# Patient Record
Sex: FEMALE | Race: WHITE | NOT HISPANIC OR LATINO | Employment: UNEMPLOYED | URBAN - NONMETROPOLITAN AREA
[De-identification: names, ages, dates, MRNs, and addresses within clinical notes are randomized per-mention and may not be internally consistent; named-entity substitution may affect disease eponyms.]

---
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
## 2024-06-11 ENCOUNTER — TELEPHONE (OUTPATIENT)
Dept: OBSTETRICS AND GYNECOLOGY | Age: 19
End: 2024-06-11

## 2024-06-11 NOTE — TELEPHONE ENCOUNTER
Caller: Daya Her    Relationship to patient: Self    Best call back number: [phone]    Chief complaint: NEEDING TO SCHED BIRTH CONTROL CONSULT    Type of visit: NEW GYN - FORMER PATIENT OF DR GARCIA.     Requested date: ASAP     Additional notes:HUB UNABLE TO WARM TRANSFER

## 2024-07-01 ENCOUNTER — OFFICE VISIT (OUTPATIENT)
Dept: PRIMARY CARE CLINIC | Age: 19
End: 2024-07-01
Payer: MEDICAID

## 2024-07-01 VITALS
HEIGHT: 64 IN | OXYGEN SATURATION: 97 % | TEMPERATURE: 97.7 F | DIASTOLIC BLOOD PRESSURE: 84 MMHG | SYSTOLIC BLOOD PRESSURE: 128 MMHG | BODY MASS INDEX: 25.52 KG/M2 | HEART RATE: 95 BPM | WEIGHT: 149.5 LBS | RESPIRATION RATE: 20 BRPM

## 2024-07-01 DIAGNOSIS — Z13.1 SCREENING FOR DIABETES MELLITUS (DM): ICD-10-CM

## 2024-07-01 DIAGNOSIS — F41.9 ANXIETY: ICD-10-CM

## 2024-07-01 DIAGNOSIS — M25.50 POLYARTHRALGIA: Primary | ICD-10-CM

## 2024-07-01 DIAGNOSIS — Z13.29 SCREENING FOR THYROID DISORDER: ICD-10-CM

## 2024-07-01 LAB
ALBUMIN SERPL-MCNC: 4.8 G/DL (ref 3.5–5.2)
ALP SERPL-CCNC: 64 U/L (ref 35–104)
ALT SERPL-CCNC: 8 U/L (ref 5–33)
ANION GAP SERPL CALCULATED.3IONS-SCNC: 12 MMOL/L (ref 7–19)
AST SERPL-CCNC: 15 U/L (ref 5–32)
BASOPHILS # BLD: 0 K/UL (ref 0–0.2)
BASOPHILS NFR BLD: 0.3 % (ref 0–1)
BILIRUB SERPL-MCNC: 1.2 MG/DL (ref 0.2–1.2)
BUN SERPL-MCNC: 12 MG/DL (ref 6–20)
CALCIUM SERPL-MCNC: 9.9 MG/DL (ref 8.6–10)
CHLORIDE SERPL-SCNC: 103 MMOL/L (ref 98–111)
CO2 SERPL-SCNC: 26 MMOL/L (ref 22–29)
CREAT SERPL-MCNC: 0.7 MG/DL (ref 0.5–0.9)
CRP SERPL HS-MCNC: <0.3 MG/DL (ref 0–0.5)
EOSINOPHIL # BLD: 0.1 K/UL (ref 0–0.6)
EOSINOPHIL NFR BLD: 0.9 % (ref 0–5)
ERYTHROCYTE [DISTWIDTH] IN BLOOD BY AUTOMATED COUNT: 11.4 % (ref 11.5–14.5)
ERYTHROCYTE [SEDIMENTATION RATE] IN BLOOD BY WESTERGREN METHOD: 5 MM/HR (ref 0–20)
GLUCOSE SERPL-MCNC: 85 MG/DL (ref 74–109)
HBA1C MFR BLD: 4.9 % (ref 4–6)
HCT VFR BLD AUTO: 39.9 % (ref 37–47)
HGB BLD-MCNC: 13.1 G/DL (ref 12–16)
IMM GRANULOCYTES # BLD: 0 K/UL
LYMPHOCYTES # BLD: 2.8 K/UL (ref 1.1–4.5)
LYMPHOCYTES NFR BLD: 31.4 % (ref 20–40)
MCH RBC QN AUTO: 32 PG (ref 27–31)
MCHC RBC AUTO-ENTMCNC: 32.8 G/DL (ref 33–37)
MCV RBC AUTO: 97.3 FL (ref 81–99)
MONOCYTES # BLD: 0.5 K/UL (ref 0–0.9)
MONOCYTES NFR BLD: 5.1 % (ref 0–10)
NEUTROPHILS # BLD: 5.5 K/UL (ref 1.5–7.5)
NEUTS SEG NFR BLD: 62.2 % (ref 50–65)
PLATELET # BLD AUTO: 353 K/UL (ref 130–400)
PMV BLD AUTO: 10.3 FL (ref 9.4–12.3)
POTASSIUM SERPL-SCNC: 3.8 MMOL/L (ref 3.5–5)
PROT SERPL-MCNC: 7.5 G/DL (ref 6.6–8.7)
RBC # BLD AUTO: 4.1 M/UL (ref 4.2–5.4)
SODIUM SERPL-SCNC: 141 MMOL/L (ref 136–145)
T4 FREE SERPL-MCNC: 1.17 NG/DL (ref 0.93–1.7)
TSH SERPL DL<=0.005 MIU/L-ACNC: 2.23 UIU/ML (ref 0.27–4.2)
WBC # BLD AUTO: 8.8 K/UL (ref 4.8–10.8)

## 2024-07-01 PROCEDURE — 99205 OFFICE O/P NEW HI 60 MIN: CPT | Performed by: NURSE PRACTITIONER

## 2024-07-01 RX ORDER — HYDROXYZINE HYDROCHLORIDE 10 MG/1
10 TABLET, FILM COATED ORAL 3 TIMES DAILY PRN
Qty: 90 TABLET | Refills: 0 | Status: SHIPPED | OUTPATIENT
Start: 2024-07-01 | End: 2024-07-31

## 2024-07-01 RX ORDER — L. ACIDOPHILUS/BIFID. ANIMALIS 2B CELL
CAPSULE ORAL
COMMUNITY

## 2024-07-01 SDOH — ECONOMIC STABILITY: FOOD INSECURITY: WITHIN THE PAST 12 MONTHS, THE FOOD YOU BOUGHT JUST DIDN'T LAST AND YOU DIDN'T HAVE MONEY TO GET MORE.: PATIENT DECLINED

## 2024-07-01 SDOH — HEALTH STABILITY: PHYSICAL HEALTH: ON AVERAGE, HOW MANY MINUTES DO YOU ENGAGE IN EXERCISE AT THIS LEVEL?: 20 MIN

## 2024-07-01 SDOH — ECONOMIC STABILITY: INCOME INSECURITY
HOW HARD IS IT FOR YOU TO PAY FOR THE VERY BASICS LIKE FOOD, HOUSING, MEDICAL CARE, AND HEATING?: PATIENT UNABLE TO ANSWER

## 2024-07-01 SDOH — ECONOMIC STABILITY: FOOD INSECURITY: WITHIN THE PAST 12 MONTHS, YOU WORRIED THAT YOUR FOOD WOULD RUN OUT BEFORE YOU GOT MONEY TO BUY MORE.: PATIENT DECLINED

## 2024-07-01 SDOH — HEALTH STABILITY: PHYSICAL HEALTH: ON AVERAGE, HOW MANY DAYS PER WEEK DO YOU ENGAGE IN MODERATE TO STRENUOUS EXERCISE (LIKE A BRISK WALK)?: 5 DAYS

## 2024-07-01 SDOH — ECONOMIC STABILITY: HOUSING INSECURITY
IN THE LAST 12 MONTHS, WAS THERE A TIME WHEN YOU DID NOT HAVE A STEADY PLACE TO SLEEP OR SLEPT IN A SHELTER (INCLUDING NOW)?: PATIENT DECLINED

## 2024-07-01 ASSESSMENT — ENCOUNTER SYMPTOMS
NAUSEA: 0
DIARRHEA: 0
VOMITING: 0
EYES NEGATIVE: 1
ALLERGIC/IMMUNOLOGIC NEGATIVE: 1
SHORTNESS OF BREATH: 0
RESPIRATORY NEGATIVE: 1
CONSTIPATION: 0
ABDOMINAL DISTENTION: 0
WHEEZING: 0
COUGH: 0

## 2024-07-01 ASSESSMENT — PATIENT HEALTH QUESTIONNAIRE - PHQ9
1. LITTLE INTEREST OR PLEASURE IN DOING THINGS: NOT AT ALL
SUM OF ALL RESPONSES TO PHQ QUESTIONS 1-9: 0
2. FEELING DOWN, DEPRESSED OR HOPELESS: NOT AT ALL
SUM OF ALL RESPONSES TO PHQ9 QUESTIONS 1 & 2: 0
SUM OF ALL RESPONSES TO PHQ QUESTIONS 1-9: 0

## 2024-07-01 NOTE — PROGRESS NOTES
[facility]  [id number] [place] [address]  Dept: [phone]  Dept Fax: [phone]  Loc: [phone]    Diane Casey is a 18 y.o. female who presents today for her medical conditions/complaints as noted below.  Diane Casey is c/o of New Patient (Pt is here to establish care. Pt has a spot on her ear that has started swelling and about a month ago it leaked some kind of fluid. ) and Generalized Body Aches (PT c/o body and joint pain that she states she has dealt with this her entire life. )        HPI:     HPI this 18-year-old female presents today to establish care.  She states that she does have some concerned over body aches developed around covid.  She states that it is mainly her joints and just hurting all over.  She states that if she does much of anything with her upper arms and it will be her shoulders and that her knees are very susceptible to cold temperatures.  She states that they started trying to calculate this back and do not do report that it seems to have started around the time that she had COVID.  She states that she did not have have a very severe case of COVID.  She does report that she basically had a migraine for several days and some mild cough.  She does report that this has been reviewed in the past and there was some lab work done but they did not really ever tell her anything and just sort of shook it off so she was trying to have further evaluation.  She also reports that she does have an issue with becoming overwhelmed and overstimulated very easily and that when mom is here with her today and reports that when this happens it is a meltdown.  Mom does report that at one point she was diagnosed with bipolar but as it turns out they feel like that was a misdiagnosis and it was more appropriately PTSD.  Mom does report that her and both the girls have had serious issues with traumatic events through over the years and they have

## 2024-07-01 NOTE — PATIENT INSTRUCTIONS
Magnesium glycinate nightly                MyFitnessPal     Sleep Hygiene    Avoid screen time for one to two hours before sleep.  Avoid caffeine four hours before bedtime.  Develop bedtime routine and be consistent with it.  Such as warm shower/bath.   Ice pack to forehead may help calm.  Calming habit such as Reading/meditation/music therapy.  May take Melatonin one hour before bedtime.         Please advise my chart message, thank you

## 2024-07-02 ASSESSMENT — ENCOUNTER SYMPTOMS
ABDOMINAL PAIN: 1
BACK PAIN: 1

## 2024-07-05 LAB — B BURGDOR.VLSE1+PEPC10 AB SER IA-ACNC: 0.21 IV

## 2024-07-16 ENCOUNTER — OFFICE VISIT (OUTPATIENT)
Dept: OBSTETRICS AND GYNECOLOGY | Age: 19
End: 2024-07-16
Payer: MEDICAID

## 2024-07-16 VITALS
WEIGHT: 152 LBS | HEIGHT: 63 IN | DIASTOLIC BLOOD PRESSURE: 72 MMHG | SYSTOLIC BLOOD PRESSURE: 128 MMHG | BODY MASS INDEX: 26.93 KG/M2

## 2024-07-16 DIAGNOSIS — Z76.89 ENCOUNTER TO ESTABLISH CARE WITH NEW DOCTOR: Primary | ICD-10-CM

## 2024-07-16 DIAGNOSIS — N92.0 MENORRHAGIA WITH REGULAR CYCLE: ICD-10-CM

## 2024-07-16 RX ORDER — NORETHINDRONE ACETATE AND ETHINYL ESTRADIOL AND FERROUS FUMARATE 1MG-20(24)
1 KIT ORAL DAILY
Qty: 28 TABLET | Refills: 12 | Status: SHIPPED | OUTPATIENT
Start: 2024-07-16 | End: 2025-07-16

## 2024-07-16 RX ORDER — HYDROXYZINE HYDROCHLORIDE 10 MG/1
10 TABLET, FILM COATED ORAL
COMMUNITY
Start: 2024-07-01 | End: 2024-08-01

## 2024-07-16 RX ORDER — L. ACIDOPHILUS/BIFID. ANIMALIS 2B CELL
CAPSULE ORAL
COMMUNITY

## 2024-07-16 NOTE — PROGRESS NOTES
"Chief Complaint   Patient presents with    Contraception     Patient is new to our office and here to establish care. Transfer from Dr. Jack. Patient is due for annual well GYN Exam, 7/11/23. Last OV 10/17/23, stopped Apri OCP to have hormone break per pt request. Periods are painful, but regular in timing. Patient denies current pelvic pain, abnormal vaginal bleeding or discharge, dyspareunia, and voices no other complaints.       History:  Daya Her is a 18 y.o. female who presents today for follow-up for evaluation of the above:    HPI    Establishing care today and she would like to restart OCP.   She reports issues with menstrual cramps and return of acne.   Stopped OCP in the past due to mood changes which did improve after stopping her OCP. No SI    Not sexually active at this time.  Periods are regular.           ROS:  Review of Systems   Constitutional: Negative.    HENT: Negative.          Acne   Eyes: Negative.    Respiratory: Negative.     Cardiovascular: Negative.    Gastrointestinal: Negative.    Endocrine: Negative.    Genitourinary:  Positive for menstrual problem.   Musculoskeletal: Negative.    Skin: Negative.    Neurological: Negative.    Psychiatric/Behavioral: Negative.         Ms. Her  reports that she has never smoked. She has never used smokeless tobacco. She reports that she does not drink alcohol and does not use drugs.      Current Outpatient Medications:     hydrOXYzine (ATARAX) 10 MG tablet, Take 1 tablet by mouth., Disp: , Rfl:     Multiple Vitamins-Minerals (One-A-Day Womens VitaCraves) chewable tablet, Chew., Disp: , Rfl:     norethindrone-ethinyl estradiol-ferrous fumarate (LOESTIN 24 FE) 1-20 MG-MCG(24) per tablet, Take 1 tablet by mouth Daily., Disp: 28 tablet, Rfl: 12      OBJECTIVE:  /72   Ht 160 cm (63\")   Wt 68.9 kg (152 lb)   LMP 06/16/2024 (Exact Date)   BMI 26.93 kg/m²    Physical Exam  Constitutional:       Appearance: She is not ill-appearing. " "  Pulmonary:      Effort: No respiratory distress.   Neurological:      Mental Status: She is oriented to person, place, and time.   Psychiatric:         Behavior: Behavior normal.         Assessment/Plan    Diagnoses and all orders for this visit:    1. Encounter to establish care with new doctor (Primary)    2. Menorrhagia with regular cycle  -     norethindrone-ethinyl estradiol-ferrous fumarate (LOESTIN 24 FE) 1-20 MG-MCG(24) per tablet; Take 1 tablet by mouth Daily.  Dispense: 28 tablet; Refill: 12    No birth control works 100 percent perfectly all the time.  Hormonal birth control is a safe and reliable way to prevent pregnancy for most people. But it does not protect you from infections that spread through sex. Condoms are the only form of birth control that can also protect against infections that you can get through sex. (These are called sexually transmitted infections, or \"STIs.\") They are a good choice if you don't know your partner's sexual history or if you or a partner already has an STI. Some people use condoms in addition to another type of birth control, such as the pill.       An After Visit Summary was printed and given to the patient at discharge.  Return in about 3 months (around 10/16/2024) for 3-4 months for medication check. Sooner if problems arise.          Kimberly ALEXANDER. 7/16/2024   Electronically Signed  "

## 2024-07-23 ENCOUNTER — OFFICE VISIT (OUTPATIENT)
Dept: PRIMARY CARE CLINIC | Age: 19
End: 2024-07-23
Payer: MEDICAID

## 2024-07-23 VITALS
OXYGEN SATURATION: 97 % | BODY MASS INDEX: 26.26 KG/M2 | HEART RATE: 81 BPM | HEIGHT: 64 IN | WEIGHT: 153.8 LBS | TEMPERATURE: 98 F | RESPIRATION RATE: 18 BRPM | SYSTOLIC BLOOD PRESSURE: 122 MMHG | DIASTOLIC BLOOD PRESSURE: 84 MMHG

## 2024-07-23 DIAGNOSIS — F41.9 ANXIETY: Primary | ICD-10-CM

## 2024-07-23 DIAGNOSIS — M25.50 POLYARTHRALGIA: ICD-10-CM

## 2024-07-23 PROCEDURE — 99214 OFFICE O/P EST MOD 30 MIN: CPT | Performed by: NURSE PRACTITIONER

## 2024-07-23 NOTE — PROGRESS NOTES
printed texts.  The electronic translation of spoken language may be erroneous, or at times, nonsensical words or phrases may be inadvertentlytranscribed.  Although I have reviewed the note for such errors, some may still exist.

## 2024-07-23 NOTE — PATIENT INSTRUCTIONS
Increase water 64 oz a day  Increase protein to 100 gm a day  Start 30 minutes of healthy movement or exercise 3 times a week     Limit   Added sugars   Sweets  Breads   Fried and fatty foods  Starchy veggies       Patient is encouraged to consider a local training or weight loss program such as:  [employer] on [employer] or [employer]  Dr. Ló[facility] program  Or other weight loss programs through a local gym.     Accountability and consistency are two keys to success to long term weight loss.     BMR  69.8   162.6   95  10 x (weight in kilogram) + 6.25 (height in centimeters) - 5 (age) - 161 = BMR  BMR x 1.2 ( for those who are not overly active) = caloric deficit   698  + 1016- 95- 161 = 1458 x 1.2 =1749    At least 100 gm a day       The fundamentals of weight loss come down to the fact that calories in have to be less than calories out which results in a calorie deficit.  However healthy weight loss will also require you to meet your basal metabolic rate.  While some of us eat way too many calories, others eat way too few causing the body to store most intake due to metabolic adaptation.     Increase water intake with a goal of 1 gallon per day.    Download a calorie counting connor such as my fitness pal or other program.    Keep a journal of everything that you eat or drink for 2 weeks.    At the end of each day calculate your caloric intake.  This gives you knowledge of what you are actually getting in each day.  By calculating your BMR you will know the amount of calories that your body needs every day to function appropriately.  Focus on making better nutritional choices, eliminate simple sugars or sugary drinks such as soda or tea.  Limit starchy vegetables such as potatoes or corn.  Use healthier choices of breads and pastas such as Banza pasta, Demetrio bread, low-carb wraps or keto bread.     Eating plans such as the Mediterranean diet, [place] diet or Trim Healthy

## 2024-07-25 ASSESSMENT — ENCOUNTER SYMPTOMS
RESPIRATORY NEGATIVE: 1
NAUSEA: 0
CONSTIPATION: 0
WHEEZING: 0
EYES NEGATIVE: 1
COUGH: 0
ABDOMINAL DISTENTION: 0
SHORTNESS OF BREATH: 0
ABDOMINAL PAIN: 0
VOMITING: 0
DIARRHEA: 0
GASTROINTESTINAL NEGATIVE: 1
ALLERGIC/IMMUNOLOGIC NEGATIVE: 1

## 2024-07-30 DIAGNOSIS — F41.9 ANXIETY: ICD-10-CM

## 2024-07-31 RX ORDER — HYDROXYZINE HYDROCHLORIDE 10 MG/1
10 TABLET, FILM COATED ORAL 3 TIMES DAILY PRN
Qty: 90 TABLET | Refills: 0 | Status: SHIPPED | OUTPATIENT
Start: 2024-07-31 | End: 2024-08-30

## 2024-08-06 ENCOUNTER — OFFICE VISIT (OUTPATIENT)
Dept: PRIMARY CARE CLINIC | Age: 19
End: 2024-08-06
Payer: MEDICAID

## 2024-08-06 VITALS
OXYGEN SATURATION: 98 % | HEART RATE: 87 BPM | SYSTOLIC BLOOD PRESSURE: 124 MMHG | BODY MASS INDEX: 27.14 KG/M2 | TEMPERATURE: 97.8 F | RESPIRATION RATE: 18 BRPM | HEIGHT: 63 IN | DIASTOLIC BLOOD PRESSURE: 80 MMHG | WEIGHT: 153.2 LBS

## 2024-08-06 DIAGNOSIS — L98.8 CUTANEOUS DIMPLE: Primary | ICD-10-CM

## 2024-08-06 PROCEDURE — 99213 OFFICE O/P EST LOW 20 MIN: CPT | Performed by: NURSE PRACTITIONER

## 2024-08-06 ASSESSMENT — ENCOUNTER SYMPTOMS
COLOR CHANGE: 0
EYES NEGATIVE: 1
GASTROINTESTINAL NEGATIVE: 1
RESPIRATORY NEGATIVE: 1
COUGH: 0
SHORTNESS OF BREATH: 0
WHEEZING: 0
ALLERGIC/IMMUNOLOGIC NEGATIVE: 1

## 2024-08-06 NOTE — PATIENT INSTRUCTIONS
Wash face with dial soap   May apply triple antibiotic ointment  as needed if red , swollen or draining  Hydrogen peroxide to site daily

## 2024-08-06 NOTE — PROGRESS NOTES
[facility]  [id number] [facility]  [place] [address]  Dept: [phone]  Dept Fax: [phone]  Loc: [phone]    Diane Casey is a 18 y.o. female who presents today for her medical conditions/complaints as noted below.  Diane Casey is c/o of Ear Drainage (Pt states that she is having some drainage of left ear. Pt states that it sometimes gets painful and is disrupting her sleep because she is a side sleeper. Pt states that it does itch at times and sometimes has a needle point feeling. Mom states she believes this is because patient was a week over due and this is from how she was laying in utero. )        HPI:     HPI this 18-year-old female is brought in today by her mom for a area of concern in front of the upper part of her earlobe.  Mom reports this has been there since she was born.  States that they thought it was because when she was in utero her ear was folded over.  States that recently she has had some what she thought was drainage coming from it and states that it did cause some discomfort when she was sleeping 1 day.  Chief Complaint   Patient presents with    Ear Drainage     Pt states that she is having some drainage of left ear. Pt states that it sometimes gets painful and is disrupting her sleep because she is a side sleeper. Pt states that it does itch at times and sometimes has a needle point feeling. Mom states she believes this is because patient was a week over due and this is from how she was laying in utero.      Past Medical History:   Diagnosis Date    Parvovirus IgM present in blood       No past surgical history on file.        8/6/2024     3:09 PM 7/23/2024     3:45 PM 7/1/2024     2:38 PM 8/3/2023     1:48 PM 6/2/2023    12:43 AM 6/2/2023    12:39 AM   Vitals   SYSTOLIC 124 122 128 112 135    DIASTOLIC 80 84 84 70 85    Site  Left Upper

## 2024-08-26 DIAGNOSIS — F41.9 ANXIETY: ICD-10-CM

## 2024-08-26 RX ORDER — HYDROXYZINE HYDROCHLORIDE 10 MG/1
TABLET, FILM COATED ORAL
Qty: 90 TABLET | Refills: 0 | Status: SHIPPED | OUTPATIENT
Start: 2024-08-26

## 2024-09-21 DIAGNOSIS — F41.9 ANXIETY: ICD-10-CM

## 2024-09-23 RX ORDER — HYDROXYZINE HYDROCHLORIDE 10 MG/1
TABLET, FILM COATED ORAL
Qty: 90 TABLET | Refills: 0 | Status: SHIPPED | OUTPATIENT
Start: 2024-09-23

## 2024-10-22 ENCOUNTER — OFFICE VISIT (OUTPATIENT)
Dept: OBSTETRICS AND GYNECOLOGY | Age: 19
End: 2024-10-22
Payer: MEDICAID

## 2024-10-22 VITALS
BODY MASS INDEX: 27.11 KG/M2 | DIASTOLIC BLOOD PRESSURE: 70 MMHG | SYSTOLIC BLOOD PRESSURE: 126 MMHG | WEIGHT: 153 LBS | HEIGHT: 63 IN

## 2024-10-22 DIAGNOSIS — N92.0 MENORRHAGIA WITH REGULAR CYCLE: Primary | ICD-10-CM

## 2024-10-22 RX ORDER — HYDROXYZINE HYDROCHLORIDE 10 MG/1
10 TABLET, FILM COATED ORAL 3 TIMES DAILY PRN
COMMUNITY

## 2024-10-22 NOTE — PROGRESS NOTES
"Chief Complaint   Patient presents with    Contraception     Patient is here to follow up after starting Loestin OCP 7/16/24 for menorrhagia with regular cycle. Pt reports improvement in cramps and mood. Pt would like to continue taking.         History:  Daya Her is a 19 y.o. female who presents today for follow-up for evaluation of the above:    HPI    Patient presents today for f/u on OCP. She started this on 07/16/2024 for menorrhagia. She reports improvement of symptoms. No negative side effects at this time.        ROS:  Review of Systems   Constitutional: Negative.    HENT: Negative.     Eyes: Negative.    Respiratory: Negative.     Cardiovascular: Negative.    Gastrointestinal: Negative.    Endocrine: Negative.    Genitourinary: Negative.    Musculoskeletal: Negative.    Skin: Negative.    Neurological: Negative.    Psychiatric/Behavioral: Negative.         Ms. Her  reports that she has never smoked. She has never used smokeless tobacco. She reports that she does not drink alcohol and does not use drugs.      Current Outpatient Medications:     hydrOXYzine (ATARAX) 10 MG tablet, Take 1 tablet by mouth 3 (Three) Times a Day As Needed for Itching., Disp: , Rfl:     Multiple Vitamins-Minerals (One-A-Day Womens VitaCraves) chewable tablet, Chew., Disp: , Rfl:     norethindrone-ethinyl estradiol-ferrous fumarate (LOESTIN 24 FE) 1-20 MG-MCG(24) per tablet, Take 1 tablet by mouth Daily., Disp: 28 tablet, Rfl: 12      OBJECTIVE:  /70   Ht 160 cm (63\")   Wt 69.4 kg (153 lb)   LMP 10/11/2024 (Exact Date)   BMI 27.10 kg/m²    Physical Exam  Constitutional:       Appearance: She is not ill-appearing.   Pulmonary:      Effort: No respiratory distress.   Neurological:      Mental Status: She is oriented to person, place, and time.   Psychiatric:         Behavior: Behavior normal.         Assessment/Plan    Diagnoses and all orders for this visit:    1. Menorrhagia with regular cycle " (Primary)  Comments:  improved on OCP at this time.         An After Visit Summary was printed and given to the patient at discharge.  Return in about 10 months (around 8/22/2025) for Annual physical. Sooner if problems arise.          Kimberly ALEXANDER. 10/23/2024   Electronically Signed

## 2024-10-24 ENCOUNTER — OFFICE VISIT (OUTPATIENT)
Dept: PRIMARY CARE CLINIC | Age: 19
End: 2024-10-24
Payer: MEDICAID

## 2024-10-24 VITALS
BODY MASS INDEX: 27.89 KG/M2 | HEART RATE: 65 BPM | TEMPERATURE: 98.7 F | RESPIRATION RATE: 16 BRPM | DIASTOLIC BLOOD PRESSURE: 62 MMHG | WEIGHT: 157.4 LBS | SYSTOLIC BLOOD PRESSURE: 110 MMHG | OXYGEN SATURATION: 99 % | HEIGHT: 63 IN

## 2024-10-24 DIAGNOSIS — M25.50 POLYARTHRALGIA: Primary | ICD-10-CM

## 2024-10-24 DIAGNOSIS — F41.9 ANXIETY AND DEPRESSION: ICD-10-CM

## 2024-10-24 DIAGNOSIS — F32.A ANXIETY AND DEPRESSION: ICD-10-CM

## 2024-10-24 PROCEDURE — 99214 OFFICE O/P EST MOD 30 MIN: CPT | Performed by: NURSE PRACTITIONER

## 2024-10-24 RX ORDER — DULOXETIN HYDROCHLORIDE 20 MG/1
20 CAPSULE, DELAYED RELEASE ORAL DAILY
Qty: 30 CAPSULE | Refills: 3 | Status: SHIPPED | OUTPATIENT
Start: 2024-10-24

## 2024-10-24 NOTE — PATIENT INSTRUCTIONS
Sprain/strain   First 24 hours, use ice to injury site for 15 minutes at a time.  After 48 hours, may alternate ice/heat 15 minutes each.  R.I. C.E. therapy = rest, ice, compression and elevation.  May use ace wrap to injured area for compression.  Use Ibuprofen or other antiinflammatory for pain and inflammation.   If no open skin areas, OTC topical lidocaine such as Icy Hot or capsaicin creams may be applied for pain relief.  Slow stretches of area may help reduce spasms and improve range of motion.   Alternate Voltaren cream with Biofreeze gel to site 2 to 3 times a day .

## 2024-10-24 NOTE — PROGRESS NOTES
HENT: Negative.     Eyes: Negative.    Respiratory: Negative.  Negative for cough, shortness of breath and wheezing.    Cardiovascular: Negative.  Negative for chest pain and palpitations.   Gastrointestinal: Negative.  Negative for abdominal distention, abdominal pain, constipation, diarrhea, nausea and vomiting.   Endocrine: Negative.    Genitourinary: Negative.  Negative for difficulty urinating and dysuria.   Musculoskeletal:  Positive for arthralgias, gait problem, myalgias and neck pain.   Skin: Negative.  Negative for color change and rash.   Allergic/Immunologic: Negative.    Neurological:  Negative for dizziness and headaches.   Hematological: Negative.    Psychiatric/Behavioral:  Positive for dysphoric mood. Negative for self-injury and suicidal ideas. The patient is nervous/anxious.        Objective:     Physical Exam  Vitals and nursing note reviewed.   Constitutional:       General: She is not in acute distress.     Appearance: Normal appearance. She is not ill-appearing or toxic-appearing.   HENT:      Head: Normocephalic and atraumatic.      Nose: Nose normal.      Mouth/Throat:      Mouth: Mucous membranes are moist.   Eyes:      Pupils: Pupils are equal, round, and reactive to light.   Cardiovascular:      Rate and Rhythm: Normal rate and regular rhythm.      Pulses: Normal pulses.      Heart sounds: Normal heart sounds. No murmur heard.  Pulmonary:      Effort: Pulmonary effort is normal. No respiratory distress.      Breath sounds: Normal breath sounds. No wheezing, rhonchi or rales.   Abdominal:      General: Bowel sounds are normal.      Palpations: Abdomen is soft.   Musculoskeletal:         General: Normal range of motion.      Right shoulder: Normal.      Left shoulder: Normal.      Cervical back: Normal range of motion.      Right lower leg: No edema.      Left lower leg: No edema.      Comments: Physical assessment of musculoskeletal system has no apparent abnormalities, reduction in

## 2024-10-28 PROBLEM — F41.9 ANXIETY AND DEPRESSION: Status: ACTIVE | Noted: 2024-10-28

## 2024-10-28 PROBLEM — F32.A ANXIETY AND DEPRESSION: Status: ACTIVE | Noted: 2024-10-28

## 2024-10-28 ASSESSMENT — ENCOUNTER SYMPTOMS
SHORTNESS OF BREATH: 0
NAUSEA: 0
ABDOMINAL PAIN: 0
VOMITING: 0
ALLERGIC/IMMUNOLOGIC NEGATIVE: 1
COUGH: 0
ABDOMINAL DISTENTION: 0
GASTROINTESTINAL NEGATIVE: 1
DIARRHEA: 0
RESPIRATORY NEGATIVE: 1
COLOR CHANGE: 0
CONSTIPATION: 0
WHEEZING: 0
EYES NEGATIVE: 1

## 2024-12-03 ENCOUNTER — OFFICE VISIT (OUTPATIENT)
Dept: PRIMARY CARE CLINIC | Age: 19
End: 2024-12-03

## 2024-12-03 VITALS
RESPIRATION RATE: 18 BRPM | HEIGHT: 63 IN | BODY MASS INDEX: 28 KG/M2 | OXYGEN SATURATION: 99 % | HEART RATE: 100 BPM | TEMPERATURE: 98 F | DIASTOLIC BLOOD PRESSURE: 84 MMHG | SYSTOLIC BLOOD PRESSURE: 112 MMHG | WEIGHT: 158 LBS

## 2024-12-03 DIAGNOSIS — M25.50 POLYARTHRALGIA: ICD-10-CM

## 2024-12-03 DIAGNOSIS — F41.9 ANXIETY AND DEPRESSION: Primary | ICD-10-CM

## 2024-12-03 DIAGNOSIS — F32.A ANXIETY AND DEPRESSION: Primary | ICD-10-CM

## 2024-12-03 DIAGNOSIS — Z23 NEED FOR INFLUENZA VACCINATION: ICD-10-CM

## 2024-12-03 DIAGNOSIS — F41.9 ANXIETY: ICD-10-CM

## 2024-12-03 ASSESSMENT — ENCOUNTER SYMPTOMS
ABDOMINAL PAIN: 0
WHEEZING: 0
VOMITING: 0
RESPIRATORY NEGATIVE: 1
CONSTIPATION: 0
EYES NEGATIVE: 1
DIARRHEA: 0
SHORTNESS OF BREATH: 0
GASTROINTESTINAL NEGATIVE: 1
ABDOMINAL DISTENTION: 0
NAUSEA: 0
ALLERGIC/IMMUNOLOGIC NEGATIVE: 1
COUGH: 0

## 2024-12-03 NOTE — PROGRESS NOTES
As per orders of BETH Peterson, injection of Flucelvax was given in Left Deltoid by Janki Dover CMA. Patient tolerated well. Advised to take tylenol/ibuprofen for soreness and fever. Understanding was voiced.

## 2024-12-03 NOTE — PROGRESS NOTES
[facility]  [id number] [facility]  [place] [address]  Dept: [phone]  Dept Fax: [phone]  Loc: [phone]    Diane Casey is a 19 y.o. female who presents today for her medical conditions/complaints as noted below.  Diane Casey is c/o of Follow-up (Pt is here for 1 month follow up on pain and anxiety/depression. Pt states she is feeling amazing. PT states the pain is still present, but it is not on the forefront of her mind. Pt states she feels like there depression and anxiety are well managed. No new concerns. )        HPI:     HPI this 19-year-old female presents today for 1 month follow-up on her anxiety and depression.  She states that she feels amazing.  She states that she still has to acknowledge that there is pain but it is not the forefront of her mind.  She states she feels like all further symptoms are well-managed.  She denies any other concerns.  She states that she is doing so much better that she is brushing her teeth twice a day and she is exercising.  States that when they went out to eat the other day in a restaurant that she actually was the one talking to the waiters which is very unusual for her.  She states that it was natural and very easy.  She is just very excited this is the best that she is felt.  She denies manic symptoms.  She states that she feels like she is being very particular about the presence that she buys for Ginny she does not samia she is overspending.  She states she has not had the urge to call or message any of her friends that she does not need to.  Does not feel she is having any poor decisions.  She just feels good  Chief Complaint   Patient presents with    Follow-up     Pt is here for 1 month follow up on pain and anxiety/depression. Pt states she is feeling amazing. PT states the pain is still present, but it is not on the forefront of her mind. Pt states she feels like there depression and anxiety are well

## 2024-12-30 RX ORDER — DULOXETIN HYDROCHLORIDE 30 MG/1
30 CAPSULE, DELAYED RELEASE ORAL DAILY
Qty: 30 CAPSULE | Refills: 3 | Status: SHIPPED | OUTPATIENT
Start: 2024-12-30

## 2025-01-11 SDOH — ECONOMIC STABILITY: FOOD INSECURITY: WITHIN THE PAST 12 MONTHS, THE FOOD YOU BOUGHT JUST DIDN'T LAST AND YOU DIDN'T HAVE MONEY TO GET MORE.: OFTEN TRUE

## 2025-01-11 SDOH — ECONOMIC STABILITY: INCOME INSECURITY: IN THE LAST 12 MONTHS, WAS THERE A TIME WHEN YOU WERE NOT ABLE TO PAY THE MORTGAGE OR RENT ON TIME?: NO

## 2025-01-11 SDOH — ECONOMIC STABILITY: TRANSPORTATION INSECURITY
IN THE PAST 12 MONTHS, HAS THE LACK OF TRANSPORTATION KEPT YOU FROM MEDICAL APPOINTMENTS OR FROM GETTING MEDICATIONS?: YES

## 2025-01-11 SDOH — ECONOMIC STABILITY: FOOD INSECURITY: WITHIN THE PAST 12 MONTHS, YOU WORRIED THAT YOUR FOOD WOULD RUN OUT BEFORE YOU GOT MONEY TO BUY MORE.: OFTEN TRUE

## 2025-01-11 SDOH — ECONOMIC STABILITY: TRANSPORTATION INSECURITY
IN THE PAST 12 MONTHS, HAS LACK OF TRANSPORTATION KEPT YOU FROM MEETINGS, WORK, OR FROM GETTING THINGS NEEDED FOR DAILY LIVING?: YES

## 2025-01-11 ASSESSMENT — PATIENT HEALTH QUESTIONNAIRE - PHQ9
10. IF YOU CHECKED OFF ANY PROBLEMS, HOW DIFFICULT HAVE THESE PROBLEMS MADE IT FOR YOU TO DO YOUR WORK, TAKE CARE OF THINGS AT HOME, OR GET ALONG WITH OTHER PEOPLE: SOMEWHAT DIFFICULT
SUM OF ALL RESPONSES TO PHQ QUESTIONS 1-9: 5
6. FEELING BAD ABOUT YOURSELF - OR THAT YOU ARE A FAILURE OR HAVE LET YOURSELF OR YOUR FAMILY DOWN: NOT AT ALL
2. FEELING DOWN, DEPRESSED OR HOPELESS: NOT AT ALL
4. FEELING TIRED OR HAVING LITTLE ENERGY: NOT AT ALL
1. LITTLE INTEREST OR PLEASURE IN DOING THINGS: SEVERAL DAYS
SUM OF ALL RESPONSES TO PHQ9 QUESTIONS 1 & 2: 1
4. FEELING TIRED OR HAVING LITTLE ENERGY: NOT AT ALL
7. TROUBLE CONCENTRATING ON THINGS, SUCH AS READING THE NEWSPAPER OR WATCHING TELEVISION: NOT AT ALL
10. IF YOU CHECKED OFF ANY PROBLEMS, HOW DIFFICULT HAVE THESE PROBLEMS MADE IT FOR YOU TO DO YOUR WORK, TAKE CARE OF THINGS AT HOME, OR GET ALONG WITH OTHER PEOPLE: SOMEWHAT DIFFICULT
SUM OF ALL RESPONSES TO PHQ QUESTIONS 1-9: 5
9. THOUGHTS THAT YOU WOULD BE BETTER OFF DEAD, OR OF HURTING YOURSELF: NOT AT ALL
8. MOVING OR SPEAKING SO SLOWLY THAT OTHER PEOPLE COULD HAVE NOTICED. OR THE OPPOSITE - BEING SO FIDGETY OR RESTLESS THAT YOU HAVE BEEN MOVING AROUND A LOT MORE THAN USUAL: SEVERAL DAYS
9. THOUGHTS THAT YOU WOULD BE BETTER OFF DEAD, OR OF HURTING YOURSELF: NOT AT ALL
7. TROUBLE CONCENTRATING ON THINGS, SUCH AS READING THE NEWSPAPER OR WATCHING TELEVISION: NOT AT ALL
SUM OF ALL RESPONSES TO PHQ QUESTIONS 1-9: 5
1. LITTLE INTEREST OR PLEASURE IN DOING THINGS: SEVERAL DAYS
5. POOR APPETITE OR OVEREATING: NOT AT ALL
8. MOVING OR SPEAKING SO SLOWLY THAT OTHER PEOPLE COULD HAVE NOTICED. OR THE OPPOSITE, BEING SO FIGETY OR RESTLESS THAT YOU HAVE BEEN MOVING AROUND A LOT MORE THAN USUAL: SEVERAL DAYS
2. FEELING DOWN, DEPRESSED OR HOPELESS: NOT AT ALL
6. FEELING BAD ABOUT YOURSELF - OR THAT YOU ARE A FAILURE OR HAVE LET YOURSELF OR YOUR FAMILY DOWN: NOT AT ALL
3. TROUBLE FALLING OR STAYING ASLEEP: NEARLY EVERY DAY
5. POOR APPETITE OR OVEREATING: NOT AT ALL
3. TROUBLE FALLING OR STAYING ASLEEP: NEARLY EVERY DAY

## 2025-01-14 ENCOUNTER — OFFICE VISIT (OUTPATIENT)
Dept: PRIMARY CARE CLINIC | Age: 20
End: 2025-01-14
Payer: MEDICAID

## 2025-01-14 VITALS
BODY MASS INDEX: 26.75 KG/M2 | OXYGEN SATURATION: 100 % | HEIGHT: 63 IN | TEMPERATURE: 97.9 F | RESPIRATION RATE: 16 BRPM | WEIGHT: 151 LBS | HEART RATE: 82 BPM | SYSTOLIC BLOOD PRESSURE: 120 MMHG | DIASTOLIC BLOOD PRESSURE: 70 MMHG

## 2025-01-14 DIAGNOSIS — F32.A ANXIETY AND DEPRESSION: Primary | ICD-10-CM

## 2025-01-14 DIAGNOSIS — F41.9 ANXIETY AND DEPRESSION: Primary | ICD-10-CM

## 2025-01-14 PROCEDURE — 99213 OFFICE O/P EST LOW 20 MIN: CPT | Performed by: NURSE PRACTITIONER

## 2025-01-14 RX ORDER — NORETHINDRONE ACETATE/ETHINYL ESTRADIOL AND FERROUS FUMARATE 1MG-20(24)
1 KIT ORAL DAILY
COMMUNITY
Start: 2025-01-02

## 2025-01-14 SDOH — ECONOMIC STABILITY: FOOD INSECURITY: WITHIN THE PAST 12 MONTHS, THE FOOD YOU BOUGHT JUST DIDN'T LAST AND YOU DIDN'T HAVE MONEY TO GET MORE.: NEVER TRUE

## 2025-01-14 SDOH — ECONOMIC STABILITY: FOOD INSECURITY: WITHIN THE PAST 12 MONTHS, YOU WORRIED THAT YOUR FOOD WOULD RUN OUT BEFORE YOU GOT MONEY TO BUY MORE.: NEVER TRUE

## 2025-01-14 NOTE — PROGRESS NOTES
[facility]  [address] [place] [address]  Dept: [phone]  Dept Fax: [phone]  Loc: [phone]    Diane Casey is a 19 y.o. female who presents today for her medical conditions/complaints as noted below.  Diane Casey is c/o of Follow-up (Anxiety and depression is still doing well with medication.  Depression screening performed online by patient. )        HPI:     HPI   Chief Complaint   Patient presents with    Follow-up     Anxiety and depression is still doing well with medication.  Depression screening performed online by patient.      Past Medical History:   Diagnosis Date    Anxiety     Depression     Parvovirus IgM present in blood       No past surgical history on file.        1/14/2025     1:42 PM 12/3/2024     3:27 PM 10/24/2024    11:42 AM 8/6/2024     3:09 PM 7/23/2024     3:45 PM 7/1/2024     2:38 PM   Vitals   SYSTOLIC 120 112 110 124 122 128   DIASTOLIC 70 84 62 80 84 84   Site Left Upper Arm Left Upper Arm   Left Upper Arm Left Upper Arm   Position Sitting Sitting   Sitting Sitting   Cuff Size  Medium Adult   Medium Adult Medium Adult   Pulse 82 100 65 87 81 95   Temp 97.9 °F (36.6 °C) 98 °F (36.7 °C) 98.7 °F (37.1 °C) 97.8 °F (36.6 °C) 98 °F (36.7 °C) 97.7 °F (36.5 °C)   Respirations 16 18 16 18 18 20   SpO2 100 % 99 % 99 % 98 % 97 % 97 %   Weight - Scale 151 lb 158 lb 157 lb 6.4 oz 153 lb 3.2 oz 153 lb 12.8 oz 149 lb 8 oz   Height 5' 3\" 5' 3\" 5' 3\" 5' 3\" 5' 4\" 5' 4\"   Body Mass Index 26.75 kg/m2 27.99 kg/m2 27.88 kg/m2 27.14 kg/m2 26.4 kg/m2 25.66 kg/m2       Family History   Problem Relation Age of Onset    Breast Cancer Mother     Cancer Maternal Grandfather     Stroke Maternal Grandmother     Arthritis Maternal Aunt     Miscarriages / Stillbirths Maternal Aunt     Rheum Arthritis Maternal Aunt        Social History     Tobacco Use    Smoking status: Never     Passive exposure: Never    Smokeless tobacco: Never   Substance Use 
  Gastrointestinal: Negative.  Negative for abdominal pain, constipation, diarrhea, nausea and vomiting.   Endocrine: Negative.    Genitourinary: Negative.  Negative for difficulty urinating and dysuria.   Musculoskeletal: Negative.    Skin: Negative.    Allergic/Immunologic: Negative.    Neurological: Negative.  Negative for headaches.   Hematological: Negative.    Psychiatric/Behavioral: Negative.  Negative for self-injury and sleep disturbance. The patient is not nervous/anxious.    All other systems reviewed and are negative.      Objective:     Physical Exam  Vitals and nursing note reviewed.   Constitutional:       General: She is not in acute distress.     Appearance: Normal appearance. She is not ill-appearing, toxic-appearing or diaphoretic.   HENT:      Head: Normocephalic and atraumatic.      Nose: Nose normal.      Mouth/Throat:      Mouth: Mucous membranes are moist.      Pharynx: Oropharynx is clear.   Eyes:      Extraocular Movements: Extraocular movements intact.      Conjunctiva/sclera: Conjunctivae normal.      Pupils: Pupils are equal, round, and reactive to light.   Cardiovascular:      Rate and Rhythm: Normal rate and regular rhythm.      Pulses: Normal pulses.      Heart sounds: Normal heart sounds. No murmur heard.     No friction rub. No gallop.   Pulmonary:      Effort: Pulmonary effort is normal. No respiratory distress.      Breath sounds: Normal breath sounds. No stridor. No wheezing, rhonchi or rales.   Abdominal:      General: Bowel sounds are normal. There is no distension.      Palpations: Abdomen is soft.      Tenderness: There is no abdominal tenderness. There is no guarding.   Musculoskeletal:         General: No swelling, tenderness or signs of injury. Normal range of motion.      Cervical back: Normal range of motion and neck supple. No muscular tenderness.      Right lower leg: No edema.      Left lower leg: No edema.   Skin:     General: Skin is warm and dry.      Capillary

## 2025-03-04 ENCOUNTER — OFFICE VISIT (OUTPATIENT)
Dept: PRIMARY CARE CLINIC | Age: 20
End: 2025-03-04
Payer: MEDICAID

## 2025-03-04 VITALS
DIASTOLIC BLOOD PRESSURE: 72 MMHG | BODY MASS INDEX: 25.87 KG/M2 | HEART RATE: 98 BPM | WEIGHT: 146 LBS | HEIGHT: 63 IN | OXYGEN SATURATION: 98 % | SYSTOLIC BLOOD PRESSURE: 98 MMHG | RESPIRATION RATE: 18 BRPM | TEMPERATURE: 98.8 F

## 2025-03-04 DIAGNOSIS — F32.A ANXIETY AND DEPRESSION: Primary | ICD-10-CM

## 2025-03-04 DIAGNOSIS — M25.50 POLYARTHRALGIA: ICD-10-CM

## 2025-03-04 DIAGNOSIS — R68.89 VIVID DREAM: ICD-10-CM

## 2025-03-04 DIAGNOSIS — F41.9 ANXIETY AND DEPRESSION: Primary | ICD-10-CM

## 2025-03-04 PROCEDURE — 99214 OFFICE O/P EST MOD 30 MIN: CPT | Performed by: NURSE PRACTITIONER

## 2025-03-04 RX ORDER — DULOXETIN HYDROCHLORIDE 20 MG/1
20 CAPSULE, DELAYED RELEASE ORAL DAILY
Qty: 30 CAPSULE | Refills: 3 | Status: SHIPPED | OUTPATIENT
Start: 2025-03-04

## 2025-03-04 NOTE — PROGRESS NOTES
[facility]  [id number] [facility]  [place] [address]  Dept: [phone]  Dept Fax: [phone]  Loc: [phone]    Diane Casey is a 19 y.o. female who presents today for her medical conditions/complaints as noted below.  Diane Casey is c/o of Discuss Medications (Pt is here to discuss her medications. She states the duloxetine was causing very vivid dreams that had her questioning reality and increased her anxiety. )        HPI:     HPI this 19-year-old female presents today to discuss her medications.  She states that she really liked the duloxetine because it took away her pain but now it is really started to cause vivid dreams.  States at times she cannot really tell a difference between reality and the dream.  She is even got up and ask her sister's questions about things events that she thought took place but turns out it was actually in a dream.  She states that she notices specifically worse after she increased her dose of Cymbalta from 20 mg to 30 mg.  She did not really mention this at the time that we increase the dose but she states that she thinks that the dizziness was maybe before then.  She denies any other side effects and she does state that it is still being very effective she is not having the pain that she has suffered with for years  Chief Complaint   Patient presents with    Discuss Medications     Pt is here to discuss her medications. She states the duloxetine was causing very vivid dreams that had her questioning reality and increased her anxiety.      Past Medical History:   Diagnosis Date    Anxiety     Depression     Parvovirus IgM present in blood       No past surgical history on file.        3/4/2025     9:52 AM 1/14/2025     1:42 PM 12/3/2024     3:27 PM 10/24/2024    11:42 AM 8/6/2024     3:09 PM 7/23/2024     3:45 PM   Vitals   SYSTOLIC 98 120 112 110 124 122   DIASTOLIC 72 70 84 62 80 84   BP Site Left Upper Arm Left Upper Arm Left

## 2025-03-04 NOTE — PATIENT INSTRUCTIONS
Reduce dose of Cymbalta to 20 mg daily   After 2 weeks reduce dose to every other day for 1 week then every 3rd for one week then stop     If at one of the lower doses , the dreams improve but pain is still controlled then continue at that dose    Referral to [facility] psychiatrist

## 2025-03-07 PROBLEM — R68.89 VIVID DREAM: Status: ACTIVE | Noted: 2025-03-07

## 2025-04-14 ENCOUNTER — OFFICE VISIT (OUTPATIENT)
Dept: PRIMARY CARE CLINIC | Age: 20
End: 2025-04-14

## 2025-04-14 VITALS
BODY MASS INDEX: 25.52 KG/M2 | OXYGEN SATURATION: 98 % | HEIGHT: 63 IN | DIASTOLIC BLOOD PRESSURE: 68 MMHG | WEIGHT: 144 LBS | HEART RATE: 88 BPM | SYSTOLIC BLOOD PRESSURE: 120 MMHG | RESPIRATION RATE: 16 BRPM | TEMPERATURE: 98.1 F

## 2025-04-14 DIAGNOSIS — F41.9 ANXIETY AND DEPRESSION: ICD-10-CM

## 2025-04-14 DIAGNOSIS — R68.89 VIVID DREAM: ICD-10-CM

## 2025-04-14 DIAGNOSIS — G89.29 OTHER CHRONIC PAIN: ICD-10-CM

## 2025-04-14 DIAGNOSIS — M25.50 POLYARTHRALGIA: Primary | ICD-10-CM

## 2025-04-14 DIAGNOSIS — F32.A ANXIETY AND DEPRESSION: ICD-10-CM

## 2025-04-14 RX ORDER — PRAZOSIN HYDROCHLORIDE 1 MG/1
1 CAPSULE ORAL NIGHTLY
COMMUNITY
Start: 2025-04-09

## 2025-04-14 RX ORDER — DULOXETIN HYDROCHLORIDE 20 MG/1
20 CAPSULE, DELAYED RELEASE ORAL 2 TIMES DAILY
Qty: 60 CAPSULE | Refills: 3 | Status: SHIPPED | OUTPATIENT
Start: 2025-04-14

## 2025-04-14 ASSESSMENT — PATIENT HEALTH QUESTIONNAIRE - PHQ9
1. LITTLE INTEREST OR PLEASURE IN DOING THINGS: NOT AT ALL
4. FEELING TIRED OR HAVING LITTLE ENERGY: NOT AT ALL
2. FEELING DOWN, DEPRESSED OR HOPELESS: NOT AT ALL
SUM OF ALL RESPONSES TO PHQ QUESTIONS 1-9: 0
9. THOUGHTS THAT YOU WOULD BE BETTER OFF DEAD, OR OF HURTING YOURSELF: NOT AT ALL
10. IF YOU CHECKED OFF ANY PROBLEMS, HOW DIFFICULT HAVE THESE PROBLEMS MADE IT FOR YOU TO DO YOUR WORK, TAKE CARE OF THINGS AT HOME, OR GET ALONG WITH OTHER PEOPLE: NOT DIFFICULT AT ALL
SUM OF ALL RESPONSES TO PHQ QUESTIONS 1-9: 0
8. MOVING OR SPEAKING SO SLOWLY THAT OTHER PEOPLE COULD HAVE NOTICED. OR THE OPPOSITE, BEING SO FIGETY OR RESTLESS THAT YOU HAVE BEEN MOVING AROUND A LOT MORE THAN USUAL: NOT AT ALL
SUM OF ALL RESPONSES TO PHQ QUESTIONS 1-9: 0
3. TROUBLE FALLING OR STAYING ASLEEP: NOT AT ALL
7. TROUBLE CONCENTRATING ON THINGS, SUCH AS READING THE NEWSPAPER OR WATCHING TELEVISION: NOT AT ALL
2. FEELING DOWN, DEPRESSED OR HOPELESS: NOT AT ALL
1. LITTLE INTEREST OR PLEASURE IN DOING THINGS: NOT AT ALL
5. POOR APPETITE OR OVEREATING: NOT AT ALL
SUM OF ALL RESPONSES TO PHQ QUESTIONS 1-9: 0
SUM OF ALL RESPONSES TO PHQ QUESTIONS 1-9: 0
6. FEELING BAD ABOUT YOURSELF - OR THAT YOU ARE A FAILURE OR HAVE LET YOURSELF OR YOUR FAMILY DOWN: NOT AT ALL
SUM OF ALL RESPONSES TO PHQ QUESTIONS 1-9: 0

## 2025-04-14 ASSESSMENT — ENCOUNTER SYMPTOMS
COUGH: 0
SHORTNESS OF BREATH: 0
RESPIRATORY NEGATIVE: 1
ALLERGIC/IMMUNOLOGIC NEGATIVE: 1
BACK PAIN: 1
GASTROINTESTINAL NEGATIVE: 1
EYES NEGATIVE: 1

## 2025-04-14 NOTE — PATIENT INSTRUCTIONS
[facility] or chiropractor  [address] [facility] , [place] [address]  Phone: [phone]      Water 64 to 80 oz     Protein 80- 100 grams

## 2025-04-14 NOTE — PROGRESS NOTES
[facility]  [place] [id number] [facility] [address]  [facility] [id number] [phone]       Diane Casey is a 19 y.o. female who presents today for her medical conditions/complaints as noted below.  Diane Casey is c/o of Medication Management (3-month-f/u.  Doing well with Duloxetine.  Denies depression.  Discuss pain today as patient desires to know more of the cause of her pain.  Hurts randomly in different places for years.  )        HPI:     History of Present Illness  The patient presents for evaluation of chronic pain and nightmares. She is accompanied by her mother.    A recurrence of pain in the occipital region is reported, which is attributed to the duloxetine. A decrease in energy levels is noted, leading to struggles with basic hygiene tasks such as brushing teeth. Neurological consultation has not been sought for these symptoms. The mother expresses concern about the possibility of fibromyalgia, given the patient's long-standing history of pain. Pain management includes the use of a heating pad, and some relief has been found with Cymbalta. Hydration is maintained at a minimum level of two bottles of water daily. The patient resumed duloxetine 30 mg due to persistent nightmares and has exhausted her supply of this dosage but has a remaining supply of the 20 mg dosage at home.    An exacerbation of nightmares has been experienced since the initiation of prazosin therapy, with the understanding that this could be a temporary side effect before improvement is observed. Prazosin 1 mg has been taken, with the option to increase the dose to 2 mg. No new onset of nausea has been experienced since starting this medication.       Chief Complaint   Patient presents with    Medication Management     3-month-f/u.  Doing well with Duloxetine.  Denies depression.  Discuss pain today as patient desires to know more of the cause of her pain.  Hurts randomly in different places

## 2025-04-24 DIAGNOSIS — M25.50 POLYARTHRALGIA: Primary | ICD-10-CM

## 2025-04-24 DIAGNOSIS — G89.29 OTHER CHRONIC PAIN: ICD-10-CM

## 2025-05-05 RX ORDER — DULOXETIN HYDROCHLORIDE 20 MG/1
20 CAPSULE, DELAYED RELEASE ORAL 2 TIMES DAILY
Qty: 60 CAPSULE | Refills: 3 | Status: SHIPPED | OUTPATIENT
Start: 2025-05-05

## 2025-05-14 ENCOUNTER — HOSPITAL ENCOUNTER (OUTPATIENT)
Dept: PHYSICAL THERAPY | Age: 20
Setting detail: THERAPIES SERIES
Discharge: HOME | End: 2025-05-14
Payer: MEDICAID

## 2025-05-14 PROCEDURE — 97162 PT EVAL MOD COMPLEX 30 MIN: CPT

## 2025-05-14 ASSESSMENT — PAIN DESCRIPTION - DESCRIPTORS: DESCRIPTORS: ACHING;SORE

## 2025-05-14 ASSESSMENT — PAIN DESCRIPTION - ORIENTATION: ORIENTATION: LEFT;RIGHT

## 2025-05-14 ASSESSMENT — PAIN DESCRIPTION - PAIN TYPE: TYPE: CHRONIC PAIN

## 2025-05-16 NOTE — PROGRESS NOTES
Function:    Independent      Current Level of Function:         Prior Level of Assist for ADLs: Independent  Ambulation Assistance: Independent    OBJECTIVE EXAMINATION     Review of Systems:  Vision: Within Functional Limits  Hearing: Within functional limits  Overall Orientation Status: Within Normal Limits  Follows Commands: Within Functional Limits    Observations:  General Observations  Description: With unsupported sitting, good upright posture.    Palpation:   Cervical Spine Palpation: Reports \"soreness\" at bilateral upper traps and periscapular musculature, no worse with palpation.  Increased muscle tension throughout cervical paraspinals, R>L, and at bilat scapular borders.  Mild winging of bilat scapulae.    Ambulation/Gait (if applicable):  Ambulation  Surface: Level tile  Device: No Device  Assistance: Independent  Quality of Gait: No gross gait deviations noted.  Normal speed, arm swing, step length.  No balance deficits noted.    Left AROM  Right AROM      General AROM UE: Right WNL, Left WNL    General AROM UE: Right WNL, Left WNL        Left Strength  Right Strength         Strength LUE  L Shoulder Flexion: 5/5  L Shoulder Extension: 5/5  L Shoulder ABduction: 5/5  L Shoulder Internal Rotation: 5/5  L Shoulder External Rotation: 4+/5  L Elbow Flexion: 5/5  L Elbow Extension: 5/5  L Upper Trapezius: 5/5  L Middle Trapezius: 4/5  L Lower Trapezius: 4-/5    Strength RUE  R Shoulder Flexion: 5/5  R Shoulder Extension: 5/5  R Shoulder ABduction: 5/5  R Shoulder Internal Rotation: 5/5  R Shoulder External Rotation: 4+/5  R Elbow Flexion: 5/5  R Elbow Extension: 5/5  R Upper Trapezius: 5/5  R Middle Trapezius: 4/5  R Lower Trapezius: 4-/5     Cervical Assessment   AROM Cervical Spine   Measured as: Degrees  Cervical flexion: 60  Cervical extension: 50  Cervical right lateral: 50  Cervical left lateral: 40  Cervical right rotation:  (100%)  Cervical left rotation:  (100%)         Thoracic Assessment    AROM

## 2025-05-21 ENCOUNTER — HOSPITAL ENCOUNTER (OUTPATIENT)
Dept: PHYSICAL THERAPY | Age: 20
Setting detail: THERAPIES SERIES
Discharge: HOME | End: 2025-05-21
Payer: MEDICAID

## 2025-05-21 PROCEDURE — 97110 THERAPEUTIC EXERCISES: CPT

## 2025-05-21 ASSESSMENT — PAIN SCALES - GENERAL: PAINLEVEL_OUTOF10: 5

## 2025-05-21 ASSESSMENT — PAIN DESCRIPTION - ORIENTATION: ORIENTATION: RIGHT;LEFT;UPPER

## 2025-05-21 ASSESSMENT — PAIN DESCRIPTION - LOCATION: LOCATION: SHOULDER;NECK;BACK

## 2025-05-21 ASSESSMENT — PAIN DESCRIPTION - PAIN TYPE: TYPE: CHRONIC PAIN

## 2025-05-21 NOTE — PROGRESS NOTES
Daily Treatment Note  Date: 2025  Patient Name: Diane Casey  MRN: [id number]     :   2005    Referring Physician: Marlene Turner APRN - * BETH Marquez   PCP: Marlene Turner APRN - NP    Medical Diagnosis: Pain in unspecified joint [M25.50]  Other chronic pain [G89.29] Polyarthralgia  Treatment Diagnosis: Polyarthralgia, chronic pain      Insurance: Payor: [employer] [place] / Plan: [place] / Product Type: *No Product type* /   Insurance ID: [id number] - (Medicaid Managed)    Subjective:  General  Diagnosis: Polyarthralgia  Referring Provider (secondary): BETH Marquez  PT Insurance Information: [employer] Medicaid (Precert req'd)  Total # of Visits Approved: 6  Total # of Visits to Date: 2  Plan of Care/Certification Expiration Date: 25  Progress Note Due Date: 25  Referring Provider (secondary): BETH Marquez  Subjective: it's about the same as it always is  Patient Currently in Pain: Yes  Pain Level: 5  Pain Type: Chronic pain  Pain Location: Shoulder, Neck, Back  Pain Orientation: Right, Left, Upper       Treatment Activities:  Exercises:      Treatment Reasoning    Exercise 1: UBE for warmup (can at LEs too)  x 3'  ue's  Exercise 2: CROM  x 10  Exercise 3: Bilat upper trap stretch  10\" x 3  Exercise 4: Bilat levator stretch  10\" x 3  Exercise 5: Bilat scalene stretch  10\" x 3  Exercise 6: Chin tucks  x 10  Exercise 7: Slouch with overcorrection  x 10  Exercise 8: Thoracic extension over back of chair with hands behind head/neck  5\" x 5  Exercise 9: Pec stretch on doorway  10\" x 4  Exercise 10: Lower trap wall lifts  x10  Exercise 11: Bilat shoulder ER with scap squeeze with t-band (Ws)  red  x 10  Exercise 12: Scap retraction with t-band  red  x 10  Exercise 13: Multifidus row/Paloff press  red  x 10  Exercise 14: Standing Is, Ts, Ys  red  x 10  Exercise 15: Mini squats  x 10  Exercise 16: Standing march x10  Exercise 17: Standing hip abd/ext  x 10  Exercise 18:

## 2025-05-28 ENCOUNTER — APPOINTMENT (OUTPATIENT)
Dept: PHYSICAL THERAPY | Age: 20
End: 2025-05-28
Payer: MEDICAID

## 2025-05-30 ENCOUNTER — HOSPITAL ENCOUNTER (OUTPATIENT)
Dept: PHYSICAL THERAPY | Age: 20
Setting detail: THERAPIES SERIES
End: 2025-05-30
Payer: MEDICAID

## 2025-06-02 RX ORDER — DULOXETIN HYDROCHLORIDE 20 MG/1
20 CAPSULE, DELAYED RELEASE ORAL 2 TIMES DAILY
Qty: 60 CAPSULE | Refills: 3 | Status: SHIPPED | OUTPATIENT
Start: 2025-06-02

## 2025-06-04 ENCOUNTER — HOSPITAL ENCOUNTER (OUTPATIENT)
Dept: PHYSICAL THERAPY | Age: 20
Setting detail: THERAPIES SERIES
Discharge: HOME | End: 2025-06-04
Payer: MEDICAID

## 2025-06-04 PROCEDURE — 97110 THERAPEUTIC EXERCISES: CPT

## 2025-06-04 ASSESSMENT — PAIN DESCRIPTION - ORIENTATION: ORIENTATION: RIGHT;LEFT

## 2025-06-04 ASSESSMENT — PAIN DESCRIPTION - PAIN TYPE: TYPE: CHRONIC PAIN

## 2025-06-04 ASSESSMENT — PAIN SCALES - GENERAL: PAINLEVEL_OUTOF10: 4

## 2025-06-04 ASSESSMENT — PAIN DESCRIPTION - LOCATION: LOCATION: SHOULDER

## 2025-06-04 NOTE — PROGRESS NOTES
Daily Treatment Note  Date: 2025  Patient Name: Diane Casey  MRN: [id number]     :   2005    Referring Physician: Marlene Turner APRN - * BETH Marquez   PCP: Marlene Turner APRN - NP    Medical Diagnosis: Pain in unspecified joint [M25.50]  Other chronic pain [G89.29] Polyarthralgia  Treatment Diagnosis: Polyarthralgia, chronic pain      Insurance: [place] / Plan: [place] / Product Type: *No Product type* /   Insurance ID: [id number] - (Medicaid Managed)    Subjective:  General  Diagnosis: Polyarthralgia  Referring Provider (secondary): BETH Marquez  PT Insurance Information: [employer] Medicaid (Precert req'd)  Total # of Visits Approved: 6  Total # of Visits to Date: 3  Plan of Care/Certification Expiration Date: 25  Progress Note Due Date: 25  Referring Provider (secondary): BETH Marquez  Subjective: i did alright after the last time, i was a little sore the next orning but not bad  Patient Currently in Pain: Yes  Pain Level: 4  Pain Type: Chronic pain  Pain Location: Shoulder (shin)  Pain Orientation: Right, Left       Treatment Activities:  Exercises:      Treatment Reasoning    Exercise 1: UBE for warmup (can at LEs too)  x 3'  ue's      ---: HEP ISSUED  Exercise 2: CROM  x 10  Exercise 3: Bilat upper trap stretch  10\" x 3  Exercise 4: Bilat levator stretch  10\" x 3  Exercise 5: Bilat scalene stretch  10\" x 3  Exercise 6: Chin tucks  x 10  Exercise 7: Slouch with overcorrection  x 10  Exercise 8: Thoracic extension over back of chair with hands behind head/neck  5\" x 5  Exercise 9: Pec stretch on doorway  10\" x 4  Exercise 10: Lower trap wall lifts  x10  Exercise 11: Bilat shoulder ER with scap squeeze with t-band (Ws)  red  x 10  Exercise 12: Scap retraction with t-band  red  x 10  Exercise 13: Multifidus row/Paloff press  red  x 10  Exercise 14: Standing Is, Ts, Ys  red  x 10  Exercise 15: Mini squats  x 10  Exercise 16: Standing march

## 2025-06-06 ENCOUNTER — APPOINTMENT (OUTPATIENT)
Dept: PHYSICAL THERAPY | Age: 20
End: 2025-06-06
Payer: MEDICAID

## 2025-07-07 ENCOUNTER — OFFICE VISIT (OUTPATIENT)
Dept: PRIMARY CARE CLINIC | Age: 20
End: 2025-07-07
Payer: MEDICAID

## 2025-07-07 VITALS
RESPIRATION RATE: 18 BRPM | WEIGHT: 145 LBS | BODY MASS INDEX: 25.69 KG/M2 | SYSTOLIC BLOOD PRESSURE: 112 MMHG | OXYGEN SATURATION: 99 % | DIASTOLIC BLOOD PRESSURE: 84 MMHG | HEART RATE: 99 BPM | HEIGHT: 63 IN | TEMPERATURE: 97.6 F

## 2025-07-07 DIAGNOSIS — M25.50 POLYARTHRALGIA: ICD-10-CM

## 2025-07-07 DIAGNOSIS — F32.A ANXIETY AND DEPRESSION: Primary | ICD-10-CM

## 2025-07-07 DIAGNOSIS — G89.29 OTHER CHRONIC PAIN: ICD-10-CM

## 2025-07-07 DIAGNOSIS — F41.9 ANXIETY AND DEPRESSION: Primary | ICD-10-CM

## 2025-07-07 LAB
25(OH)D3 SERPL-MCNC: 37.6 NG/ML
ALBUMIN SERPL-MCNC: 4.3 G/DL (ref 3.5–5.2)
ALP SERPL-CCNC: 56 U/L (ref 35–104)
ALT SERPL-CCNC: 15 U/L (ref 10–35)
ANION GAP SERPL CALCULATED.3IONS-SCNC: 14 MMOL/L (ref 8–16)
AST SERPL-CCNC: 39 U/L (ref 10–35)
BASOPHILS # BLD: 0 K/UL (ref 0–0.2)
BASOPHILS NFR BLD: 0.5 % (ref 0–1)
BILIRUB SERPL-MCNC: 0.8 MG/DL (ref 0.2–1.2)
BUN SERPL-MCNC: 9 MG/DL (ref 6–20)
CALCIUM SERPL-MCNC: 9.4 MG/DL (ref 8.6–10)
CHLORIDE SERPL-SCNC: 102 MMOL/L (ref 98–107)
CHOLEST SERPL-MCNC: 190 MG/DL (ref 0–199)
CO2 SERPL-SCNC: 21 MMOL/L (ref 22–29)
CREAT SERPL-MCNC: 1 MG/DL (ref 0.5–0.9)
EOSINOPHIL # BLD: 0.1 K/UL (ref 0–0.6)
EOSINOPHIL NFR BLD: 0.6 % (ref 0–5)
ERYTHROCYTE [DISTWIDTH] IN BLOOD BY AUTOMATED COUNT: 11.7 % (ref 11.5–14.5)
GLUCOSE SERPL-MCNC: 86 MG/DL (ref 70–99)
HBA1C MFR BLD: 5 % (ref 4–5.6)
HCT VFR BLD AUTO: 40 % (ref 37–47)
HDLC SERPL-MCNC: 55 MG/DL (ref 40–60)
HGB BLD-MCNC: 13.4 G/DL (ref 12–16)
IMM GRANULOCYTES # BLD: 0 K/UL
LDLC SERPL CALC-MCNC: 117 MG/DL
LYMPHOCYTES # BLD: 3.3 K/UL (ref 1.1–4.5)
LYMPHOCYTES NFR BLD: 39.4 % (ref 20–40)
MCH RBC QN AUTO: 32.3 PG (ref 27–31)
MCHC RBC AUTO-ENTMCNC: 33.5 G/DL (ref 33–37)
MCV RBC AUTO: 96.4 FL (ref 81–99)
MONOCYTES # BLD: 0.4 K/UL (ref 0–0.9)
MONOCYTES NFR BLD: 5.2 % (ref 0–10)
NEUTROPHILS # BLD: 4.6 K/UL (ref 1.5–7.5)
NEUTS SEG NFR BLD: 54.2 % (ref 50–65)
PLATELET # BLD AUTO: 416 K/UL (ref 130–400)
PMV BLD AUTO: 10.9 FL (ref 9.4–12.3)
POTASSIUM SERPL-SCNC: 4.2 MMOL/L (ref 3.5–5.1)
PROT SERPL-MCNC: 7.1 G/DL (ref 6.4–8.3)
RBC # BLD AUTO: 4.15 M/UL (ref 4.2–5.4)
SODIUM SERPL-SCNC: 137 MMOL/L (ref 136–145)
T4 FREE SERPL-MCNC: 1.16 NG/DL (ref 0.93–1.7)
TRIGL SERPL-MCNC: 92 MG/DL (ref 0–149)
TSH SERPL DL<=0.005 MIU/L-ACNC: 2.5 UIU/ML (ref 0.27–4.2)
WBC # BLD AUTO: 8.5 K/UL (ref 4.8–10.8)

## 2025-07-07 PROCEDURE — 99214 OFFICE O/P EST MOD 30 MIN: CPT | Performed by: NURSE PRACTITIONER

## 2025-07-07 RX ORDER — DESOGESTREL AND ETHINYL ESTRADIOL 0.15-0.03
KIT ORAL
COMMUNITY
End: 2025-07-07

## 2025-07-07 ASSESSMENT — ENCOUNTER SYMPTOMS
WHEEZING: 0
COUGH: 0
GASTROINTESTINAL NEGATIVE: 1
RESPIRATORY NEGATIVE: 1
SHORTNESS OF BREATH: 0
EYES NEGATIVE: 1
ALLERGIC/IMMUNOLOGIC NEGATIVE: 1

## 2025-07-07 NOTE — PROGRESS NOTES
[facility]  [place] [id number] [facility] [address]  [facility] [id number] [phone]       Diane Casey is a 19 y.o. female who presents today for her medical conditions/complaints as noted below.  Diane Casey is c/o of Discuss Medications (Pt is here to discuss medications and have a Genesight test done. She wishes to wean off the cymbalta. )        HPI:     History of Present Illness  The patient presents for evaluation of depression and body pain.    She reports a positive response to the discontinuation of Cymbalta, with no noticeable side effects. However, over the past 2 months, she has experienced increased depression, making it difficult to care for herself and her pets. Her family has observed changes in her mood and energy levels, describing her as sluggish and ruyb. She consumes approximately one bottle of water daily. She has not been taking magnesium supplements due to their large size, despite running out of her gummy vitamins. She is seeking GeneSight testing today. She has previously taken hydroxyzine and prazosin but has not used any other medications for anxiety or depression.    She continues to experience body pain, particularly in her shoulders, but has not sought physical therapy due to insurance limitations. Instead, she follows a home exercise regimen provided by her therapist, which she finds beneficial in boosting her energy levels.       Chief Complaint   Patient presents with    Discuss Medications     Pt is here to discuss medications and have a Genesight test done. She wishes to wean off the cymbalta.      Past Medical History:   Diagnosis Date    Anxiety     Depression     Parvovirus IgM present in blood       No past surgical history on file.        7/7/2025     2:01 PM 6/4/2025     3:06 PM 5/21/2025     3:10 PM 4/14/2025     1:20 PM 3/4/2025     9:52 AM 1/14/2025     1:42 PM   Vitals   SYSTOLIC 112   120 98 120   DIASTOLIC 84   68 72 70

## 2025-07-07 NOTE — PATIENT INSTRUCTIONS
Water goal 64 to 80 oz     Magnesium glycinate 200- 400 mg nightly     10- 15 min every morning direct sunlight     Color on your tray

## 2025-07-08 ENCOUNTER — RESULTS FOLLOW-UP (OUTPATIENT)
Dept: PRIMARY CARE CLINIC | Age: 20
End: 2025-07-08

## 2025-07-09 ENCOUNTER — HOSPITAL ENCOUNTER (INPATIENT)
Age: 20
LOS: 2 days | Discharge: HOME | DRG: 885 | End: 2025-07-11
Attending: PSYCHIATRY & NEUROLOGY | Admitting: PSYCHIATRY & NEUROLOGY
Payer: MEDICAID

## 2025-07-09 DIAGNOSIS — R45.851 SUICIDAL IDEATION: ICD-10-CM

## 2025-07-09 DIAGNOSIS — R45.89 AT RISK FOR SELF HARM: Primary | ICD-10-CM

## 2025-07-09 PROBLEM — F32.A DEPRESSION WITH SUICIDAL IDEATION: Status: ACTIVE | Noted: 2025-07-09

## 2025-07-09 LAB
ALBUMIN SERPL-MCNC: 4.2 G/DL (ref 3.5–5.2)
ALP SERPL-CCNC: 55 U/L (ref 35–104)
ALT SERPL-CCNC: 10 U/L (ref 10–35)
AMPHET UR QL SCN: NEGATIVE
ANION GAP SERPL CALCULATED.3IONS-SCNC: 12 MMOL/L (ref 8–16)
AST SERPL-CCNC: 18 U/L (ref 10–35)
BACTERIA URNS QL MICRO: ABNORMAL /HPF
BARBITURATES UR QL SCN: NEGATIVE
BASOPHILS # BLD: 0.1 K/UL (ref 0–0.2)
BASOPHILS NFR BLD: 0.5 % (ref 0–1)
BENZODIAZ UR QL SCN: NEGATIVE
BILIRUB SERPL-MCNC: 0.9 MG/DL (ref 0.2–1.2)
BILIRUB UR QL STRIP: NEGATIVE
BUN SERPL-MCNC: 8 MG/DL (ref 6–20)
BUPRENORPHINE URINE: NEGATIVE
CALCIUM SERPL-MCNC: 9 MG/DL (ref 8.6–10)
CANNABINOIDS UR QL SCN: NEGATIVE
CHLORIDE SERPL-SCNC: 102 MMOL/L (ref 98–107)
CLARITY UR: CLEAR
CO2 SERPL-SCNC: 22 MMOL/L (ref 22–29)
COCAINE UR QL SCN: NEGATIVE
COLOR UR: YELLOW
CREAT SERPL-MCNC: 1 MG/DL (ref 0.5–0.9)
CRYSTALS URNS MICRO: ABNORMAL /HPF
DRUG SCREEN COMMENT UR-IMP: NORMAL
EOSINOPHIL # BLD: 0.1 K/UL (ref 0–0.6)
EOSINOPHIL NFR BLD: 0.5 % (ref 0–5)
EPI CELLS #/AREA URNS AUTO: 4 /HPF (ref 0–5)
ERYTHROCYTE [DISTWIDTH] IN BLOOD BY AUTOMATED COUNT: 11.8 % (ref 11.5–14.5)
ETHANOLAMINE SERPL-MCNC: <11 MG/DL (ref 0–11)
FENTANYL SCREEN, URINE: NEGATIVE
GLUCOSE SERPL-MCNC: 91 MG/DL (ref 70–99)
GLUCOSE UR STRIP.AUTO-MCNC: NEGATIVE MG/DL
HCG SERPL QL: NEGATIVE
HCT VFR BLD AUTO: 37.1 % (ref 37–47)
HGB BLD-MCNC: 12.5 G/DL (ref 12–16)
HGB UR STRIP.AUTO-MCNC: NEGATIVE MG/L
HYALINE CASTS #/AREA URNS AUTO: 6 /HPF (ref 0–8)
IMM GRANULOCYTES # BLD: 0 K/UL
KETONES UR STRIP.AUTO-MCNC: NEGATIVE MG/DL
LEUKOCYTE ESTERASE UR QL STRIP.AUTO: ABNORMAL
LYMPHOCYTES # BLD: 2.5 K/UL (ref 1.1–4.5)
LYMPHOCYTES NFR BLD: 23.7 % (ref 20–40)
MCH RBC QN AUTO: 32.6 PG (ref 27–31)
MCHC RBC AUTO-ENTMCNC: 33.7 G/DL (ref 33–37)
MCV RBC AUTO: 96.9 FL (ref 81–99)
METHADONE UR QL SCN: NEGATIVE
METHAMPHETAMINE, URINE: NEGATIVE
MONOCYTES # BLD: 0.5 K/UL (ref 0–0.9)
MONOCYTES NFR BLD: 4.6 % (ref 0–10)
NEUTROPHILS # BLD: 7.3 K/UL (ref 1.5–7.5)
NEUTS SEG NFR BLD: 70.5 % (ref 50–65)
NITRITE UR QL STRIP.AUTO: NEGATIVE
OPIATES UR QL SCN: NEGATIVE
OXYCODONE UR QL SCN: NEGATIVE
PCP UR QL SCN: NEGATIVE
PH UR STRIP.AUTO: 6.5 [PH] (ref 5–8)
PLATELET # BLD AUTO: 362 K/UL (ref 130–400)
PMV BLD AUTO: 10.7 FL (ref 9.4–12.3)
POTASSIUM SERPL-SCNC: 3.9 MMOL/L (ref 3.5–5.1)
PROT SERPL-MCNC: 7.1 G/DL (ref 6.4–8.3)
PROT UR STRIP.AUTO-MCNC: NEGATIVE MG/DL
RBC # BLD AUTO: 3.83 M/UL (ref 4.2–5.4)
RBC #/AREA URNS AUTO: 4 /HPF (ref 0–4)
SARS-COV-2 RDRP RESP QL NAA+PROBE: NOT DETECTED
SODIUM SERPL-SCNC: 136 MMOL/L (ref 136–145)
SP GR UR STRIP.AUTO: 1.01 (ref 1–1.03)
TRICYCLIC ANTIDEPRESSANTS, UR: NEGATIVE
UROBILINOGEN UR STRIP.AUTO-MCNC: 0.2 E.U./DL
WBC # BLD AUTO: 10.4 K/UL (ref 4.8–10.8)
WBC #/AREA URNS AUTO: 6 /HPF (ref 0–5)

## 2025-07-09 PROCEDURE — 80053 COMPREHEN METABOLIC PANEL: CPT

## 2025-07-09 PROCEDURE — 6370000000 HC RX 637 (ALT 250 FOR IP): Performed by: PSYCHIATRY & NEUROLOGY

## 2025-07-09 PROCEDURE — 82077 ASSAY SPEC XCP UR&BREATH IA: CPT

## 2025-07-09 PROCEDURE — 80307 DRUG TEST PRSMV CHEM ANLYZR: CPT

## 2025-07-09 PROCEDURE — 85025 COMPLETE CBC W/AUTO DIFF WBC: CPT

## 2025-07-09 PROCEDURE — 87635 SARS-COV-2 COVID-19 AMP PRB: CPT

## 2025-07-09 PROCEDURE — 99285 EMERGENCY DEPT VISIT HI MDM: CPT

## 2025-07-09 PROCEDURE — G0480 DRUG TEST DEF 1-7 CLASSES: HCPCS

## 2025-07-09 PROCEDURE — 1240000000 HC EMOTIONAL WELLNESS R&B

## 2025-07-09 PROCEDURE — 36415 COLL VENOUS BLD VENIPUNCTURE: CPT

## 2025-07-09 PROCEDURE — 81001 URINALYSIS AUTO W/SCOPE: CPT

## 2025-07-09 PROCEDURE — 84703 CHORIONIC GONADOTROPIN ASSAY: CPT

## 2025-07-09 RX ORDER — TRAZODONE HYDROCHLORIDE 50 MG/1
50 TABLET ORAL NIGHTLY PRN
Status: DISCONTINUED | OUTPATIENT
Start: 2025-07-09 | End: 2025-07-11 | Stop reason: HOSPADM

## 2025-07-09 RX ORDER — POLYETHYLENE GLYCOL 3350 17 G/17G
17 POWDER, FOR SOLUTION ORAL DAILY PRN
Status: DISCONTINUED | OUTPATIENT
Start: 2025-07-09 | End: 2025-07-11 | Stop reason: HOSPADM

## 2025-07-09 RX ORDER — HYDROXYZINE HYDROCHLORIDE 25 MG/1
25 TABLET, FILM COATED ORAL 3 TIMES DAILY PRN
Status: DISCONTINUED | OUTPATIENT
Start: 2025-07-09 | End: 2025-07-10

## 2025-07-09 RX ORDER — ACETAMINOPHEN 325 MG/1
650 TABLET ORAL EVERY 4 HOURS PRN
Status: DISCONTINUED | OUTPATIENT
Start: 2025-07-09 | End: 2025-07-11 | Stop reason: HOSPADM

## 2025-07-09 RX ORDER — PRAZOSIN HYDROCHLORIDE 2 MG/1
2 CAPSULE ORAL NIGHTLY
Status: DISCONTINUED | OUTPATIENT
Start: 2025-07-09 | End: 2025-07-11 | Stop reason: HOSPADM

## 2025-07-09 RX ORDER — DULOXETIN HYDROCHLORIDE 20 MG/1
20 CAPSULE, DELAYED RELEASE ORAL DAILY
Status: ON HOLD | COMMUNITY
End: 2025-07-11 | Stop reason: HOSPADM

## 2025-07-09 RX ORDER — MECOBALAMIN 5000 MCG
5 TABLET,DISINTEGRATING ORAL NIGHTLY PRN
Status: DISCONTINUED | OUTPATIENT
Start: 2025-07-09 | End: 2025-07-11 | Stop reason: HOSPADM

## 2025-07-09 RX ADMIN — HYDROXYZINE HYDROCHLORIDE 25 MG: 25 TABLET ORAL at 23:59

## 2025-07-09 RX ADMIN — PRAZOSIN HYDROCHLORIDE 2 MG: 2 CAPSULE ORAL at 23:59

## 2025-07-09 SDOH — ECONOMIC STABILITY: INCOME INSECURITY: HOW HARD IS IT FOR YOU TO PAY FOR THE VERY BASICS LIKE FOOD, HOUSING, MEDICAL CARE, AND HEATING?: HARD

## 2025-07-09 SDOH — ECONOMIC STABILITY: INCOME INSECURITY: IN THE PAST 12 MONTHS, HAS THE ELECTRIC, GAS, OIL, OR WATER COMPANY THREATENED TO SHUT OFF SERVICE IN YOUR HOME?: NO

## 2025-07-09 SDOH — ECONOMIC STABILITY: FOOD INSECURITY: WITHIN THE PAST 12 MONTHS, YOU WORRIED THAT YOUR FOOD WOULD RUN OUT BEFORE YOU GOT MONEY TO BUY MORE.: SOMETIMES TRUE

## 2025-07-09 ASSESSMENT — PATIENT HEALTH QUESTIONNAIRE - PHQ9
SUM OF ALL RESPONSES TO PHQ QUESTIONS 1-9: 6
2. FEELING DOWN, DEPRESSED OR HOPELESS: NEARLY EVERY DAY
SUM OF ALL RESPONSES TO PHQ QUESTIONS 1-9: 6
SUM OF ALL RESPONSES TO PHQ QUESTIONS 1-9: 6
1. LITTLE INTEREST OR PLEASURE IN DOING THINGS: NEARLY EVERY DAY
SUM OF ALL RESPONSES TO PHQ QUESTIONS 1-9: 6

## 2025-07-09 ASSESSMENT — SLEEP AND FATIGUE QUESTIONNAIRES
AVERAGE NUMBER OF SLEEP HOURS: 8
DO YOU HAVE DIFFICULTY SLEEPING: YES
DO YOU USE A SLEEP AID: YES

## 2025-07-09 ASSESSMENT — PAIN - FUNCTIONAL ASSESSMENT: PAIN_FUNCTIONAL_ASSESSMENT: NONE - DENIES PAIN

## 2025-07-09 ASSESSMENT — LIFESTYLE VARIABLES
HOW OFTEN DO YOU HAVE A DRINK CONTAINING ALCOHOL: NEVER
HOW OFTEN DO YOU HAVE A DRINK CONTAINING ALCOHOL: NEVER
HOW MANY STANDARD DRINKS CONTAINING ALCOHOL DO YOU HAVE ON A TYPICAL DAY: PATIENT DOES NOT DRINK

## 2025-07-09 NOTE — ED PROVIDER NOTES
(electronically signed)  AttendingEmergency Physician         Ronnie Gann, BETH Christian NP  07/09/25 [id number]       Ronnie Gann, BETH Christian NP  07/09/25 2294

## 2025-07-10 PROCEDURE — 6370000000 HC RX 637 (ALT 250 FOR IP): Performed by: NURSE PRACTITIONER

## 2025-07-10 PROCEDURE — 1240000000 HC EMOTIONAL WELLNESS R&B

## 2025-07-10 PROCEDURE — 6370000000 HC RX 637 (ALT 250 FOR IP): Performed by: PSYCHIATRY & NEUROLOGY

## 2025-07-10 RX ORDER — NORETHINDRONE ACETATE AND ETHINYL ESTRADIOL 1MG-20(21)
1 KIT ORAL DAILY
Status: DISCONTINUED | OUTPATIENT
Start: 2025-07-10 | End: 2025-07-10 | Stop reason: SDUPTHER

## 2025-07-10 RX ORDER — BUPROPION HYDROCHLORIDE 150 MG/1
150 TABLET ORAL DAILY
Status: DISCONTINUED | OUTPATIENT
Start: 2025-07-10 | End: 2025-07-11 | Stop reason: HOSPADM

## 2025-07-10 RX ADMIN — Medication 5 MG: at 00:03

## 2025-07-10 RX ADMIN — Medication 5 MG: at 21:24

## 2025-07-10 RX ADMIN — TRAZODONE HYDROCHLORIDE 50 MG: 50 TABLET ORAL at 21:24

## 2025-07-10 RX ADMIN — PRAZOSIN HYDROCHLORIDE 2 MG: 2 CAPSULE ORAL at 21:24

## 2025-07-10 RX ADMIN — BUPROPION HYDROCHLORIDE 150 MG: 150 TABLET, EXTENDED RELEASE ORAL at 11:58

## 2025-07-10 ASSESSMENT — LIFESTYLE VARIABLES
HOW OFTEN DO YOU HAVE A DRINK CONTAINING ALCOHOL: NEVER
HOW MANY STANDARD DRINKS CONTAINING ALCOHOL DO YOU HAVE ON A TYPICAL DAY: PATIENT DOES NOT DRINK

## 2025-07-10 NOTE — DISCHARGE INSTRUCTIONS
https://www.Curasightrx.365looks (Coqueta.me)  8.293.731.3198  NeedyMeds  Website: https://www.needymeds.org/  0.126.599.4071  RxAssist  Website: https://www.rxassist.org/  7.886.378.4785  [employer]  Website: https://www.[employer] Servicesundation.org  8.662.541.0251   Single Care  www.singlecare.365looks (Coqueta.me)     [phone]  [employer] Perks  www.perks.optum.365looks ([place]queta.me)    [phone]  Many drug manufacturers offer prescription assistance programs on their website.    Assistance with Medical Expenses:  [employer] Financial Assistance [phone]  What they offer: The [facility] Financial Assistance Program helps uninsured patients who do not qualify for government-sponsored health insurance and cannot afford to pay for their medical care. Insured patients may also qualify for assistance based on family income, family size, and medical needs.     How to apply for the [facility] Financial Assistance Program (FAP):  Option 1: To apply for financial assistance, a patient (or their family or other provider) should fill out the Financial Assistance Application. Copies of the Financial Assistance Application and the FAP may be obtained for free by calling the [facility] Customer Service Department at [phone].  Option 2: The Financial Assistance Application and policy may be obtained for free by downloading a copy from the [employer] website:   https://www.[employer]/patient-resources/financial-assistance     [facility] Financial Counselor [phone]  [facility] [place] [address]    [employer] [phone]  [address] [place] [address]  9 AM - 3:30?PM   Monday - Friday    [facility] [phone]  [address] [place] [address]  8 AM - 4:30?PM Monday - Friday    [employer] [phone]  [address] [place] [address]    [employer]  Educates and supports moms and dads through the journey of pregnancy and parenting.  Website: https://[facility]unlimitedSt[facility].org/  1101

## 2025-07-10 NOTE — PLAN OF CARE
Problem: Coping  Goal: Pt/Family able to verbalize concerns and demonstrate effective coping strategies  Description: INTERVENTIONS:  1. Assist patient/family to identify coping skills, available support systems and cultural and spiritual values  2. Provide emotional support, including active listening and acknowledgement of concerns of patient and caregivers  3. Reduce environmental stimuli, as able  4. Instruct patient/family in relaxation techniques, as appropriate  5. Assess for spiritual pain/suffering and initiate Spiritual Care, Psychosocial Clinical Specialist consults as needed  7/10/2025 1541 by Ivonne Woodward MSW  Outcome: Progressing   Group Note    Date: 07/10/25  Start Time: 3:00 PM   End Time:3:30 PM     Number of Participants: 7    Type of Group: Psychotherapy     Goal: Foster self expression in regards to emotions/feelings Goal: Motivating patient to interact and express different emotions in group.     Objective: Encouraged and provide opportunity to patient to discuss emotions and thoughts on personal experiences     Participation Level: Active Listener    Participation Quality: Appropriate, Attentive, Sharing, and Supportive    Speech:  normal    Thought Process/Content: Logical    Mood: calm    Level of consciousness:  Alert and Attentive    Response to Learning: Able to verbalize current knowledge/experience, Able to verbalize/acknowledge new learning, Able to retain information, Capable of insight, and Able to change behavior    Modes of Intervention: Education and Support    Discipline Responsible: /Counselor     Signature:  LAITH Phillips  
  Problem: Safety - Adult  Goal: Free from fall injury  7/10/2025 1340 by Sara Mclaughlin RN  Outcome: Progressing  7/10/2025 0120 by Rosy Ambrose RN  Outcome: Progressing     Problem: Self Harm/Suicidality  Goal: Will have no self-injury during hospital stay  Description: INTERVENTIONS:  1.  Ensure constant observer at bedside with Q15M safety checks  2.  Maintain a safe environment  3.  Secure patient belongings  4.  Ensure family/visitors adhere to safety recommendations  5.  Ensure safety tray has been added to patient's diet order  6.  Every shift and PRN: Re-assess suicidal risk via Frequent Screener    7/10/2025 1340 by Sara Mclaughlin RN  Outcome: Progressing  Flowsheets (Taken 7/10/2025 1327)  Will have no self-injury during hospital stay:   Ensure constant observer at bedside with Q15M safety checks   Maintain a safe environment   Secure patient belongings  7/10/2025 0120 by Rosy Ambrose RN  Outcome: Progressing     Problem: Anxiety  Goal: Will report anxiety at manageable levels  Description: INTERVENTIONS:  1. Administer medication as ordered  2. Teach and rehearse alternative coping skills  3. Provide emotional support with 1:1 interaction with staff  7/10/2025 1340 by Sara Mclaughlin RN  Outcome: Progressing  Flowsheets (Taken 7/10/2025 1327)  Will report anxiety at manageable levels:   Administer medication as ordered   Provide emotional support with 1:1 interaction with staff   Teach and rehearse alternative coping skills  7/10/2025 0120 by Rosy Ambrose RN  Outcome: Progressing     Problem: Coping  Goal: Pt/Family able to verbalize concerns and demonstrate effective coping strategies  Description: INTERVENTIONS:  1. Assist patient/family to identify coping skills, available support systems and cultural and spiritual values  2. Provide emotional support, including active listening and acknowledgement of concerns of patient and caregivers  3. Reduce environmental stimuli, as able  4. Instruct 
patient/family right to receive or not to receive information  6. Serve as a liaison between patient and family and health care team  7. Initiate Consults from Ethics, Palliative Care or initiate Family Care Conference as is appropriate  Outcome: Progressing     Problem: Behavior  Goal: Pt/Family maintain appropriate behavior and adhere to behavioral management agreement, if implemented  Description: INTERVENTIONS:  1. Assess patient/family's coping skills and  non-compliant behavior (including use of illegal substances)  2. Notify security of behavior or suspected illegal substances which indicate the need for search of the family and/or belongings  3. Encourage verbalization of thoughts and concerns in a socially appropriate manner  4. Utilize positive, consistent limit setting strategies supporting safety of patient, staff and others  5. Encourage participation in the decision making process about the behavioral management agreement  6. If a visitor's behavior poses a threat to safety call refer to organization policy.  7. Initiate consult with , Psychosocial CNS, Spiritual Care as appropriate  Outcome: Progressing     Problem: Depression/Self Harm  Goal: Effect of psychiatric condition will be minimized and patient will be protected from self harm  Description: INTERVENTIONS:  1. Assess impact of patient's symptoms on level of functioning, self care needs and offer support as indicated  2. Assess patient/family knowledge of depression, impact on illness and need for teaching  3. Provide emotional support, presence and reassurance  4. Assess for possible suicidal thoughts or ideation. If patient expresses suicidal thoughts or statements do not leave alone, initiate Suicide Precautions, move to a room close to the nursing station and obtain sitter  5. Initiate consults as appropriate with Mental Health Professional, Spiritual Care, Psychosocial CNS, and consider a recommendation to the LIP for a

## 2025-07-10 NOTE — H&P
[facility]    Psychiatric Initial Evaluation    Date of Evaluation:  7/10/2025  Session Type:  08504 Psychiatric Diagnostic Interview Exam   Name:  Diane Casey  Age:  19 y.o.  Sex:  female  Ethnicity:   Primary Care Physician:  Marlene Turner APRN - NP   Patient Care Team:  Patient Care Team:  Marlene Turner APRN - NP as PCP - General (Nurse Practitioner, Family)  Marlene Turner APRN - NP as PCP - Empaneled Provider  Chief Complaint: \" I was depressed and had suicidal thoughts.\"    History of Present Illness    Historian: patient  Complaint Type: anxiety, depression, loss of interest in favorite activities, and sleep disturbance  Course of Symptoms: ongoing  Symptoms Onset: gradual  Onset Approximately: gradual  Precipitating Factors: History of depression  Severity: moderate  Risk Factors:   history of depression    Patient is a 19 year old/ c/f with a history of depression. She presents with depression and suicidal ideations reporting that she wishes she could be \"hit by a car.\" She also reported that she wanted to \"pour hot water on her foot.\" She states, \"I thought that if I did that my mom would not make me do everything.\" UDS negative. Bal negative. Medical history include Depression and Anxiety. She was prescribed Cymbalta in November of last year.     Today she endorses feeling sad and lonely. She endorses having suicidal thoughts for the past 2 months. She reports losing her job 3 months ago and feels things have \"spiraled\" since then. She also reports stressors as her sister moving out of the home and her mother was never home and was spending more time with men. Patient reports that she was the one spending most of the time with her 7 year old brother. She reports that this makes her feel like her mother thinks she is a  and not a person with her own life. Feels her mother does not value her. Feels that her mother only wants her in the home to cook, clean

## 2025-07-10 NOTE — ED NOTES
Notified  nurse of pt coming up and note is in.  No further questions per nurse.  Pt to go up to floor soon. Notified security.

## 2025-07-10 NOTE — RESEARCH
Collateral obtained from: Cierra Haywood-[phone]-pt's mom    Immediate Stressors & Time Episode Began: \"I guess would be overwhelmed, too much responsibility, and spread really thin and unable to handle it. The only other thing would be a fear of being left behind or feeling like she's not where she should be, when she really is. She's a great kid but gets overwhelmed very easily. She has these 3 cats and she's always worried about one of them dying and while she's there she is worried they won't get enough snuggles.\"     Diagnosis/Hx of compliance with meds: \"She hasn't been dx with anything or been medicated with anything.\"     Tx Hx/Past hospitalizations:  caller reports previous outpatient treatment history --- history includes \"She's had a therapist for a couple years now at "[facility] but they quit taking our insurance.\"     Family hx of psychiatric issues: caller reports family history of psychiatric issues -- history includes \"I had breast cancer when my kids were young so I dealt with depression and anxiety because of that.\"     Substance Abuse: caller reports no substance abuse history    Pending Legal: caller reports no pending legal issues    Safety Issues (Weapons? Hx of attempts): \"I have some, but they are not kept in the home, and she does not have access to them.\" The importance of locking weapons in a secured location or removing from home was explained and recommended to collateral caller.    Support system/Medication Managed by: \"Her sister Monalisa, and my parents who live next door and have always been a big part of our life. The importance of medication management and locking extra medication in a secured location was explained and recommended to collateral caller.     Discharge Disposition: home -lives with family-lives with her mother and brother and her mother will pick her up at the time of discharge.     Additional Info:

## 2025-07-10 NOTE — H&P
[facility]al Services  Medicare Certification Upon Admission    I certify that this patient's inpatient psychiatric hospital admission is medically necessary for:    [x] (1) Treatment which could reasonably be expected to improve this patient's condition,       [x] (2) Or for diagnostic study;     AND     [x](2) The inpatient psychiatric services are provided while the individual is under the care of a physician and are included in the individualized plan of care.    Estimated length of stay/service : 3 days-5 days    Plan for post-hospital care : TBD    Electronically signed by BETH Casillas on 7/10/2025 at 9:41 AM

## 2025-07-10 NOTE — ED NOTES
Per Cleo MCCLURE, while walking back to hallway 5 pt reported to RN that her family was concerned that her cymbalta has been affect her moods and suicidal thoughts, so she has recently been weening off of her cymbalta.

## 2025-07-10 NOTE — ED NOTES
ED TO INPATIENT SBAR HANDOFF    Patient Name: Diane Casey   : 2005  19 y.o.   Family/Caregiver Present: No  Code Status Order: No Order    C-SSRS: Risk of Suicide: Moderate Risk  Sitter No  Restraints:         Situation  Chief Complaint:   Chief Complaint   Patient presents with    Mental Health Problem     Pt states she has been depressed, sad, lonely, and having suicidal thoughts.  She has been wishing that she could be hit by a car.  Had been in therapy, but it was cancelled by her insurance 1 month ago.     Patient Diagnosis: No admission diagnoses are documented for this encounter.     Brief Description of Patient's Condition: passive SI, wishes she would get hit by a car but no plan to harm self.  Mental Status: oriented, alert, coherent, and able to concentrate and follow conversation  Arrived from: home    Imaging:   No orders to display     COVID-19 Results:   Internal Administration   First Dose      Second Dose           Last COVID Lab SARS-CoV-2, NAAT (no units)   Date Value   2025 Not Detected           Abnormal labs:   Abnormal Labs Reviewed   URINALYSIS WITH REFLEX TO CULTURE - Abnormal; Notable for the following components:       Result Value    Leukocyte Esterase, Urine SMALL (*)     All other components within normal limits   CBC WITH AUTO DIFFERENTIAL - Abnormal; Notable for the following components:    RBC 3.83 (*)     MCH 32.6 (*)     Neutrophils % 70.5 (*)     All other components within normal limits   COMPREHENSIVE METABOLIC PANEL - Abnormal; Notable for the following components:    Creatinine 1.0 (*)     All other components within normal limits   MICROSCOPIC URINALYSIS - Abnormal; Notable for the following components:    WBC, UA 6 (*)     All other components within normal limits     Background  Allergies: No Known Allergies  Current Medications:     History:   Past Medical History:   Diagnosis Date    Anxiety     Depression     Parvovirus IgM present in blood

## 2025-07-11 VITALS
SYSTOLIC BLOOD PRESSURE: 106 MMHG | HEART RATE: 99 BPM | TEMPERATURE: 98.6 F | WEIGHT: 143.3 LBS | HEIGHT: 63 IN | BODY MASS INDEX: 25.39 KG/M2 | RESPIRATION RATE: 18 BRPM | OXYGEN SATURATION: 98 % | DIASTOLIC BLOOD PRESSURE: 76 MMHG

## 2025-07-11 PROBLEM — F33.2 SEVERE EPISODE OF RECURRENT MAJOR DEPRESSIVE DISORDER, WITHOUT PSYCHOTIC FEATURES (HCC): Status: ACTIVE | Noted: 2025-07-11

## 2025-07-11 PROBLEM — G89.29 CHRONIC PAIN: Status: ACTIVE | Noted: 2025-07-11

## 2025-07-11 LAB
CHOLEST SERPL-MCNC: 182 MG/DL (ref 0–199)
HBA1C MFR BLD: 5 % (ref 4–5.6)
HDLC SERPL-MCNC: 49 MG/DL (ref 40–60)
LDLC SERPL CALC-MCNC: 115 MG/DL
TRIGL SERPL-MCNC: 89 MG/DL (ref 0–149)

## 2025-07-11 PROCEDURE — 6370000000 HC RX 637 (ALT 250 FOR IP): Performed by: NURSE PRACTITIONER

## 2025-07-11 PROCEDURE — 80061 LIPID PANEL: CPT

## 2025-07-11 PROCEDURE — 36415 COLL VENOUS BLD VENIPUNCTURE: CPT

## 2025-07-11 PROCEDURE — 83036 HEMOGLOBIN GLYCOSYLATED A1C: CPT

## 2025-07-11 RX ORDER — BUPROPION HYDROCHLORIDE 150 MG/1
150 TABLET ORAL DAILY
Qty: 30 TABLET | Refills: 0 | Status: SHIPPED | OUTPATIENT
Start: 2025-07-12

## 2025-07-11 RX ADMIN — BUPROPION HYDROCHLORIDE 150 MG: 150 TABLET, EXTENDED RELEASE ORAL at 08:34

## 2025-07-11 NOTE — H&P
HISTORY and PHYSICAL      CHIEF COMPLAINT:  SI    Reason for Admission:  SI    History Obtained From:  patient, chart    HISTORY OF PRESENT ILLNESS:      The patient is a 19 y.o. female who is admitted to the [facility] unit with worsening mood issues. She has had no new medical issues.     Past Medical History:        Diagnosis Date    Anxiety     Depression     Parvovirus IgM present in blood      Past Surgical History:    History reviewed. No pertinent surgical history.      Medications Prior to Admission:    Medications Prior to Admission: DULoxetine (CYMBALTA) 20 MG extended release capsule, Take 1 capsule by mouth daily Indications: **Pt is currently being weaned off of Cymbalta.  Will start taking it everyother day.**  prazosin (MINIPRESS) 1 MG capsule, Take 2 capsules by mouth nightly  JASSON 24 FE 1-20 MG-MCG(24) TABS, Take 1 tablet by mouth daily  hydrOXYzine HCl (ATARAX) 10 MG tablet, TAKE 1 TABLET BY MOUTH THREE TIMES DAILY AS NEEDED FOR ITCHING (Patient not taking: Reported on 7/9/2025)  Multiple Vitamins-Minerals (ONE-A-DAY WOMENS VITACRAVES) CHEW, Take by mouth (Patient not taking: Reported on 7/9/2025)    Allergies:  Patient has no known allergies.    Social History:   TOBACCO:   reports that she has never smoked. She has never been exposed to tobacco smoke. She has never used smokeless tobacco.  ETOH:   reports no history of alcohol use.  DRUGS:   reports no history of drug use.        Family History:       Problem Relation Age of Onset    Breast Cancer Mother     Cancer Maternal Grandfather     Stroke Maternal Grandmother     Arthritis Maternal Aunt     Miscarriages / Stillbirths Maternal Aunt     Rheum Arthritis Maternal Aunt      REVIEW OF SYSTEMS:  Constitutional: neg  CV: neg  Pulmonary: neg  GI: neg  : neg  Psych: SI  Neuro: neg  Skin: neg  MusculoSkeletal: neg  HEENT: neg  Joints: neg    Vitals:  /72   Pulse 99   Temp 97.3 °F (36.3

## 2025-07-11 NOTE — PROGRESS NOTES
Group Note    Date: 07/10/25  Start Time: 8:00 AM   End Time:8:30 AM     Number of Participants: 11    Type of Group: Community/Goal     Patient's Goal:  \"Familiarizing myself with the space\"    Notes:      Status After Intervention:      Participation Level: Active Listener    Participation Quality: Appropriate    Speech:  normal    Thought Process/Content: Logical    Mood: Calm    Level of consciousness:  Alert    Response to Learning: Able to verbalize current knowledge/experience    Modes of Intervention: Education and Support    Discipline Responsible: Behavioral Health Technician     Signature:  PANTERA LUCIA  
                                                                Group Note    Date: 07/10/25  Start Time: 8:00 PM   End Time:8:30 PM     Number of Participants: 8    Type of Group: Wrap-Up     Patient's Goal:  Work in my journal.    Notes:  Normal energy, appetite good, concentration normal, depression improved, and anxiety was on/off.    Status After Intervention:      Participation Level: Active Listener    Participation Quality: Appropriate    Speech:  normal    Thought Process/Content: Logical    Mood: Calm    Level of consciousness:  Alert    Response to Learning: Able to verbalize current knowledge/experience    Modes of Intervention: Education and Support    Discipline Responsible: Behavioral Health Technician     Signature:  Judah Starks  
                                                  Admission Note      Reason for admission/Target Symptom: Per nursing admission assessment - Reason for Admission: Diane Casey is a 19 year old female brought to ER for concerns of depression, sadness and suicidal ideations. Pt stated that she has b een having thoughts of harming herself so she could be hospitalized. She states that she thought about prouring boiling water on her foot  to cause burn. The also thoug that she sometimes wishes she would just be hit by a car. Pt has had these thoughts for past two months and pt has been in therapy in the past but no longer has medical insurance. pt denies HI, AVH and deneis recent illness.    Diagnoses: Depression with Suicidal Ideation    UDS: Negative   BAL: Negative <11    SW will meet with treatment team to discuss patient's treatment including care planning, discharge planning, and follow-up needs. Patient has been admitted to [facility].     Treatment team will identify the patient's discharge needs. Appointments will be made for medication management and outpatient therapy/counseling. Pt confirmed the need for ongoing treatment post inpatient stay. Pt was also provided a handout of contact information for drug and alcohol treatment centers and other community support service such as [facility], AA, and [facility].    
                                                 Treatment Team Note:    Therapist met with treatment team to discuss patients treatment, progress toward treatment goals and discharge plans.    Target Symptoms/Reason for admission: Per nursing admission assessment - Reason for Admission: Diane Casey is a 19 year old female brought to ER for concerns of depression, sadness and suicidal ideations. Pt stated that she has b een having thoughts of harming herself so she could be hospitalized. She states that she thought about prouring boiling water on her foot  to cause burn. The also thoug that she sometimes wishes she would just be hit by a car. Pt has had these thoughts for past two months and pt has been in therapy in the past but no longer has medical insurance. pt denies HI, AVH and deneis recent illness.    Diagnoses per psych provider: Suicidal ideation [R45.851]  At risk for self harm [R45.89]  Depression with suicidal ideation [F32.A, R45.851]    Patient's aftercare plan is: [facility]    Aftercare appointments made: No - SW will make discharge appointments    Pt lives with: SW will meet with patient to gather information    Collateral obtained from: SW will meet with patient to gather information    Attending groups: New admission - SW will monitor group attendance    Behavior: calm and cooperative    Has patient been completing ADL's:  New admission - unknown at this time - SW will monitor    Sleeping:New admission - unknown at this time.    Taking medication: New admission - unknown at this time.    Misc:                                                   
                                                 Treatment Team Note:    Therapist met with treatment team to discuss patients treatment, progress toward treatment goals and discharge plans.    Target Symptoms/Reason for admission: Per nursing admission assessment - Reason for Admission: Diane Casey is a 19 year old female brought to ER for concerns of depression, sadness and suicidal ideations. Pt stated that she has b een having thoughts of harming herself so she could be hospitalized. She states that she thought about prouring boiling water on her foot  to cause burn. The also thoug that she sometimes wishes she would just be hit by a car. Pt has had these thoughts for past two months and pt has been in therapy in the past but no longer has medical insurance. pt denies HI, AVH and deneis recent illness.    Diagnoses per psych provider: Suicidal ideation [R45.851]  At risk for self harm [R45.89]  Depression with suicidal ideation [F32.A, R45.851]    Patient's aftercare plan is: [facility]    Aftercare appointments made: No - SW will make discharge appointments    Pt lives with: mother    Collateral obtained from: pt's mother Cierra  Collateral obtained on:  7/10/25    Attending groups: Yes    Behavior: social    Has patient been completing ADL's:  Yes    Sleeping:Yes    Taking medication: Yes    Misc: Per night nursing note:  Pt was cooperative with assessment, but tearful. Her expressions were sad and worried. She spent most of her time in the tv area and was not social with others. She rated her depression a 4 and her anxiety a 7 and denies SI, HI and AVH. She says \"everything started to spiral after my cat  and I lost my hob.\" Pt has been crying while we talked. She is wanting to go home. We have discussed the importance of participating in her treatment and completing her time here. She voiced understanding. We talked about coping skills and ways to use them-such as coloring or playing games and 
Discharge Note     Patient is discharging on this date. Patient denies SI, HI, and AVH at this time. Patient reports improvement in behavior and is leaving unit in overall good condition. SW and patient discussed patient's follow up appointments and importance of attending appointments as scheduled, patient voiced understanding and agreement. Patient and SW also discussed patient's safety plan and patient was able to verbally identify: warning signs, coping strategies, places and people that help make the patient feel better/distract negative thoughts, friends/family/agencies/professionals the patient can reach out to in a crisis, and something that is important to the patient/worth living for. Patient was provided the national suicide prevention hotline number (1-775.219.5520) as well as local community behavioral health ([facility]) crisis number for emergencies (1-359.660.3455).     Discharge Disposition: home -lives with family      Pt to follow up with [facility] on July 21 , 2025 at 4:00 PM for the intake appointment with OXANA Wilcox.     Patient will follow up with [facility] on July 22 , 2025 at 9:45 AM for the medication management appointment with Poonam Saini PA-C.     Referral to outpatient tobacco cessation counseling treatment:  Patient refused referral to outpatient tobacco cessation counseling    SW offered to assist patient with transportation, patient declined transportation assistance, she reported that her gpa will be picking her up today.    
Group Note    Date: 07/11/25  Start Time: 10:15 AM   End Time:11:00 AM     Number of Participants: 6    Type of Group: Psychotherapy     Therapeutic Goal: Foster self expression in regards to emotions/feelings     Objective: Encouraged patient to participate and discuss with group members     Participation Level: Active Listener and Interactive    Participation Quality: Appropriate, Attentive, Sharing, and Supportive    Speech:  normal    Thought Process/Content: Logical    Mood: calm    Level of consciousness:  Alert and Attentive    Response to Learning: Able to verbalize current knowledge/experience, Able to verbalize/acknowledge new learning, Able to retain information, Capable of insight, Able to change behavior, and Progressing to goal    Modes of Intervention: Education and Support    Discipline Responsible: /Counselor     Signature:  LAITH Phillips  
[facility] Adult Unit Daily Assessment  Nursing Progress Note    Room: [facility]/621A-01   Name: Diane Casey   Age: 19 y.o.   Gender: female   Dx: Depression with suicidal ideation  Precautions: suicide risk  Inpatient Status: voluntary     Medical Bed:   Is patient in a medical bed? no   If medical bed is in use, has nursing secured room while patient is awake and out of the room? yes  Has safety checks by nursing been completed on the bed/room this shift? NA    Protective Factors:  Patient identifies protective factors with nursing staff as follows:   Identifies reasons for living: Yes   Supportive Social Network or family: Yes    Belief that suicide is immoral/high spirituality: Yes   Responsibility to family or others/living with family: Yes   Fear of death or dying due to pain and suffering: Yes   Engaged in work or school: No  If Patient is unable to identify, reason why?     MEDICAL:  Vitals:  BP Readings from Last 2 Encounters:   07/10/25 101/66   07/07/25 112/84     Accu-Chek: No  Oxygen/CPAP/BiPAP: No  CIWA/CINA: No     MEDICATIONS:  Med Compliant: Yes  Other PRN Meds: Yes   PAIN Assessment: none  Side Effects from medication: No    SLEEP:  Sleep Quality Good  Sleep Medications: Yes   PRN Sleep Meds: Yes     Meals:  Appetite: good   Percent Meals: 75%      GROUPS:  Group Participation: Yes  Participation Quality: Interactive  Social:Yes    Behavioral Health:  Depression: 5   Anxiety: 7   SI denies suicidal ideation   Risk of Suicide: No Risk  HI Negative for homicidal ideation        AVH:no If Hallucinations are present, describe?     Speech: normal   Appearance: appropriately dressed and healthy looking    Notes: Patient is alert and is cooperative with staff. Patient is compliant with medications. Patient states she is sleeping well and has a good appetite. Patient has a flat affect. Patient rates her depression a 5 and rates her anxiety a 7. Patient denies SI, HI, AVH.        Electronically signed by Sara 
Progress Note  Diane Casey  7/11/2025 4:58 PM  Subjective:   Admit Date:   7/9/2025      CC/ADMIT DX:       Interval History:   Reviewed overnight events and nursing notes.  She has had no new medical issues.     I have reviewed all labs/diagnostics from the last 24hrs.       ROS:   I have done a 10 point ROS and all are negative, except what is mentioned in the HPI.    No diet orders on file    Medications:             Objective:   Vitals: /76   Pulse 99   Temp 98.6 °F (37 °C) (Temporal)   Resp 18   Ht 1.6 m (5' 3\")   Wt 65 kg (143 lb 4.8 oz)   LMP 06/16/2025 (Approximate)   SpO2 98%   BMI 25.38 kg/m²  No intake or output data in the 24 hours ending 07/11/25 1658  General appearance: alert and cooperative with exam  Extremities: extremities normal, atraumatic, no cyanosis or edema  Neurologic:  No obvious focal neurologic deficits.    Assessment and Plan:   Principal Problem:    Severe episode of recurrent major depressive disorder, without psychotic features (HCC)  Active Problems:    Depression with suicidal ideation    Chronic pain  Resolved Problems:    * No resolved hospital problems. *      Plan:   Continue present medication(s)    Labs are reviewed and stable   Follow with Psych      Discharge planning:   her home     Reviewed treatment plans with the patient and/or family.             Electronically signed by Tod Teague MD on 7/11/2025 at 4:58 PM  
SW met with patient to complete psychosocial, lifetime CSSR-S and OQ-30 Questionnaire on this date. Patients long and short-term goals discussed. Patient voiced understanding. Treatment plan sheet signed. Patient verbalized understanding of the treatment plan. Patient participated in goals and objectives of the treatment plan. Patient discussed safety plan with .    In the last 6 months has the patient been a danger to self: No  In the last 6 months has the patient been a danger to others: No  Legal Guardian/POA: No    Activity Assessment:  Skills: crafts, working on the computer   Talents: crafts and helping with siblings   Interests: crafts, cats and film      Provided patient with [employer] handout entitled \"Quitting Smoking.\"  Reviewed handout with patient: addressing dangers of smoking, developing coping skills, and providing basic information about quitting.       Patient received all components practical counseling of tobacco practical counseling during the hospital stay.        
Spiritual Health History and Assessment/Progress Note  [facility]    (P) Loneliness/Social Isolation, (P) Emotional distress, (P) Adjustment to illness, (P) Follow up     Name: Diane Casey MRN: [id number]    Age: 19 y.o.     Sex: female   Language: English   Yazdanism: None   Severe episode of recurrent major depressive disorder, without psychotic features ([facility])     Date: 7/11/2025            Total Time Calculated: (P) 25 min              Spiritual Assessment began in [facility] 6 ADULT [facility]        Referral/Consult From: (P) Rounding   Encounter Overview/Reason: (P) Loneliness/Social Isolation  Service Provided For: (P) Patient    Sabi, Belief, Meaning:   Patient identifies as spiritual  Family/Friends are connected with a sabi tradition or spiritual practice      Importance and Influence: The patient was raised by her parents and had Gnosticist brought up in her life.      Patient has spiritual/personal beliefs that influence decisions regarding their health  Family/Friends have spiritual/personal beliefs that influence decisions regarding the patient's health    Community:  Patient is connected with a spiritual community  Family/Friends feel well-supported. Support system includes: Parent/s    Assessment and Plan of Care:   The patient said, she developed a feelings or worthlessness and inconsiderate by the family members.  She mentioned that her friendly circles has hard time to understand her. She recognized that it would be better for me die than live.      Patient Interventions include: Facilitated expression of thoughts and feelings, Explored spiritual coping/struggle/distress, and Engaged in theological reflection. The patient received affirmation of her strength and hope so that she can continue her journey and accomplished her goals in life.    Family/Friends Interventions include: No family/friends present    Patient Plan of Care: No spiritual needs identified for follow-up  Family/Friends Plan 
[facility] Adult Unit Daily Assessment  Nursing Progress Note    Room: [facility]/621A-01   Name: Diane Casey   Age: 19 y.o.   Gender: female   Dx: Depression with suicidal ideation  Precautions: suicide risk  Inpatient Status: voluntary     Medical Bed:   Is patient in a medical bed? no   If medical bed is in use, has nursing secured room while patient is awake and out of the room? NA  Has safety checks by nursing been completed on the bed/room this shift? yes    Protective Factors:  Patient identifies protective factors with nursing staff as follows:   Identifies reasons for living: Yes   Supportive Social Network or family: Yes    Belief that suicide is immoral/high spirituality: Yes   Responsibility to family or others/living with family: Yes   Fear of death or dying due to pain and suffering: Yes   Engaged in work or school: No  If Patient is unable to identify, reason why? NA    MEDICAL:  Vitals:  BP Readings from Last 2 Encounters:   07/10/25 110/72   07/07/25 112/84     Accu-Chek: No  Oxygen/CPAP/BiPAP: No  CIWA/CINA: No     MEDICATIONS:  Med Compliant: Yes  Other PRN Meds: No  PAIN Assessment: none  Side Effects from medication: none voiced    SLEEP:  Sleep Quality Good  Sleep Medications: NO  PRN Sleep Meds: Yes; trazadone 50mg, melatonin 5mg     Meals:  Appetite: good   Percent Meals: no meals consumed during this shift      GROUPS:  Group Participation: Yes  Participation Quality: Minimal  Social:Yes    Behavioral Health:  Depression: 4   Anxiety: 7   SI denies suicidal ideation   Risk of Suicide: No Risk  HI Negative for homicidal ideation        AVH:no If Hallucinations are present, describe? N/A    Speech: normal   Appearance: appropriately dressed    Notes: Pt was cooperative with assessment, but tearful. Her expressions were sad and worried. She spent most of her time in the tv area and was not social with others. She rated her depression a 4 and her anxiety a 7 and denies SI, HI and AVH. She says 
No  Level of Assistance: Independent/Self  Facial Expression: Worried, Flat  Affect: Congruent  Level of Consciousness: Alert  Frequency of Checks: 4 times per hour, close  Mood:Normal: No  Mood: Depressed, Anxious, Helpless  Motor Activity:Normal: Yes  Eye Contact: Fair  Observed Behavior: Cooperative  Sexual Misconduct History: Current - no  Preception: [place] to person, [place] to time, [place] to place, [place] to situation  Attention:Normal: No  Attention: Distractible  Thought Processes: Circumstantial  Thought Content:Normal: No  Thought Content: Preoccupations  Depression Symptoms: Feelings of helplessness, Feelings of hopelessess, Feelings of worthlessness, Impaired concentration, Loss of interest, Sleep disturbance  Anxiety Symptoms: Panic attack, Generalized  [place] Symptoms: Rapid cycling, Poor judgment  Hallucinations: None  Delusions: No  Memory:Normal: No  Memory: Poor recent  Insight and Judgment: No  Insight and Judgment: Poor judgment, Poor insight    Protective Factors:  Patient identifies protective factors with nursing staff as follows:   Identifies reasons for living: Yes   Supportive Social Network or family: Yes    Belief that suicide is immoral/high spirituality: Yes   Responsibility to family or others/living with family: Yes   Fear of death or dying due to pain and suffering: No   Engaged in work or school: No               If Patient is unable to identify, reason why?     PATIENT STRENGTHS and Barriers:   Patient Strengths/Barriers  Strengths (Must Choose Two): Motivation level for treatment, Spirituality, Support from friends, Stable housing  Barriers: Support from family, Sense of humor, Independent living    Medical Problems:   Past Medical History:   Diagnosis Date    Anxiety     Depression     Parvovirus IgM present in blood        Medical Bed:  Does patient require a medical bed? No  If answered yes for medical bed use, does the patient have the following conditions?   High risk for

## 2025-07-11 NOTE — DISCHARGE INSTR - DIET

## 2025-07-11 NOTE — BH NOTE
Behavioral Health   Discharge Note  Bridge Appointment completed: Reviewed Discharge Instructions with patient.    Patient verbalizes understanding and agreement with the discharge plan using the teachback method.     Referral for Outpatient Tobacco Cessation Counseling, upon discharge (jason X if applicable and completed):    ( )  Hospital staff assisted patient to call Quit Line or faxed referral                                   during hospitalization                  ( )  Recognizing danger situations (included triggers and roadblocks), if not completed on admission                    ( )  Coping skills (new ways to manage stress, exercise, relaxation techniques, changing routine, distraction), if not completed on admission                                                           ( )  Basic information about quitting (benefits of quitting, techniques in how to quit, available resources, if not completed on admission  ( ) [facility]   ( ) Patient refused referral  ( ) Patient refused counseling  ( ) Patient refused smoking cessation medication upon discharge  ( X) Patient non-tobacco use    Vaccinations (jason X if applicable and completed):  ( ) Patient states already received influenza vaccine elsewhere  ( ) Patient received influenza vaccine during this hospitalization  ( X) Patient refused influenza vaccine at this time      Pt discharged with followings belongings:  Dental Appliances: None  Vision - Corrective Lenses: None  Hearing Aid: None  Jewelry: None  Body Piercings Removed: No  Clothing: Footwear, Jacket/Coat, Pajamas, Shirt, Shorts, Socks, Undergarments  Other Valuables: Money, Credit/Debit Card   Valuables sent home withPATIENT.   Valuables retrieved from safe and returned to patient.    Patient left department with  GRANDFATHER via CAR , discharged to HOME .   Patient education on aftercare instructions: YES    Patient verbalize understanding of AVS:

## 2025-07-11 NOTE — DISCHARGE SUMMARY
to the situation, with a normal range and intensity  Thought Processes:  Coherently communicated, logical and goal oriented  Thought Content: At this time No Suicidal Ideation, No Homicidal Ideation, No Auditory or Visual  Hallucinations, No Overt Delusions  Insight:  Present  Judgement:  Normal  Memory is intact for both remote and recent  Intellectual Functioning:  Within the Average Range  Fund of Knowledge:  Adequate  Attention and Concentration:  Adequate    Discharge Exam:  Gait stable  Speaks in full sentences without shortness of air    Disposition: home    Patient Instructions:   Current Discharge Medication List        START taking these medications    Details   buPROPion (WELLBUTRIN XL) 150 MG extended release tablet Take 1 tablet by mouth daily  Qty: 30 tablet, Refills: 0           CONTINUE these medications which have NOT CHANGED    Details   prazosin (MINIPRESS) 1 MG capsule Take 2 capsules by mouth nightly      JASSON 24 FE 1-20 MG-MCG(24) TABS Take 1 tablet by mouth daily      Multiple Vitamins-Minerals (ONE-A-DAY WOMENS VITACRAVES) CHEW Take by mouth           STOP taking these medications       DULoxetine (CYMBALTA) 20 MG extended release capsule Comments:   Reason for Stopping: patient reports no benefit from this medication and was being tapered off         hydrOXYzine HCl (ATARAX) 10 MG tablet Comments:   Reason for Stopping:  patient reports no longer taking this medication             Activity: activity as tolerated  Diet: regular diet  Wound Care: none needed    Follow-up with   PCP in 2 weeks.    [facility] on 7/21/2025 at 4pm and 7/22/2025 at 9:45 am     Time worked: More than 31 minutes    Participation:good    Electronically signed by Li Agurire, ELENA-BC, FNP-C on 7/11/2025 at 2:01 PM

## 2025-07-17 ENCOUNTER — OFFICE VISIT (OUTPATIENT)
Dept: OBSTETRICS AND GYNECOLOGY | Age: 20
End: 2025-07-17
Payer: MEDICAID

## 2025-07-17 VITALS
BODY MASS INDEX: 25.27 KG/M2 | DIASTOLIC BLOOD PRESSURE: 74 MMHG | WEIGHT: 142.6 LBS | SYSTOLIC BLOOD PRESSURE: 112 MMHG | HEIGHT: 63 IN

## 2025-07-17 DIAGNOSIS — R35.0 URINARY FREQUENCY: ICD-10-CM

## 2025-07-17 DIAGNOSIS — R10.2 PELVIC PAIN IN FEMALE: Primary | ICD-10-CM

## 2025-07-17 DIAGNOSIS — N94.10 FEMALE DYSPAREUNIA: ICD-10-CM

## 2025-07-17 LAB
BILIRUB BLD-MCNC: ABNORMAL MG/DL
CLARITY, POC: ABNORMAL
COLOR UR: ABNORMAL
GLUCOSE UR STRIP-MCNC: NEGATIVE MG/DL
KETONES UR QL: ABNORMAL
LEUKOCYTE EST, POC: ABNORMAL
NITRITE UR-MCNC: NEGATIVE MG/ML
PH UR: 5 [PH] (ref 5–8)
PROT UR STRIP-MCNC: ABNORMAL MG/DL
RBC # UR STRIP: NEGATIVE /UL
SP GR UR: 1.02 (ref 1–1.03)
UROBILINOGEN UR QL: ABNORMAL

## 2025-07-17 PROCEDURE — 87491 CHLMYD TRACH DNA AMP PROBE: CPT | Performed by: NURSE PRACTITIONER

## 2025-07-17 PROCEDURE — 81513 NFCT DS BV RNA VAG FLU ALG: CPT | Performed by: NURSE PRACTITIONER

## 2025-07-17 PROCEDURE — 87591 N.GONORRHOEAE DNA AMP PROB: CPT | Performed by: NURSE PRACTITIONER

## 2025-07-17 PROCEDURE — 87563 M. GENITALIUM AMP PROBE: CPT | Performed by: NURSE PRACTITIONER

## 2025-07-17 PROCEDURE — 87481 CANDIDA DNA AMP PROBE: CPT | Performed by: NURSE PRACTITIONER

## 2025-07-17 PROCEDURE — 87661 TRICHOMONAS VAGINALIS AMPLIF: CPT | Performed by: NURSE PRACTITIONER

## 2025-07-17 RX ORDER — PRAZOSIN HYDROCHLORIDE 1 MG/1
CAPSULE ORAL
COMMUNITY
Start: 2025-06-11

## 2025-07-17 RX ORDER — BUPROPION HYDROCHLORIDE 150 MG/1
1 TABLET ORAL DAILY
COMMUNITY
Start: 2025-07-11

## 2025-07-17 NOTE — PROGRESS NOTES
"Chief Complaint   Patient presents with    Pelvic Pain     Pt here c/o pelvic pain that started a year ago but worse on the right more often, worse with intercourse, she had u/s done in office today       History:  Daya Her is a 19 y.o. female who presents today for follow-up for evaluation of the above:    HPI  Patient presents today with c/o pelvic pain and dyspareunia.   [place] and lighter periods since starting her OCP but her pelvic pain has not improved  Her US today is normal  No cysts or masses         ROS:  Review of Systems   Constitutional: Negative.    HENT: Negative.     Eyes: Negative.    Respiratory: Negative.     Cardiovascular: Negative.    Gastrointestinal: Negative.    Endocrine: Negative.    Genitourinary:  Positive for dyspareunia, frequency and pelvic pain.   Musculoskeletal: Negative.    Skin: Negative.    Neurological: Negative.    Psychiatric/Behavioral: Negative.         Ms. Her  reports that she has never smoked. She has never used smokeless tobacco. She reports that she does not drink alcohol and does not use drugs.      Current Outpatient Medications:     buPROPion XL (WELLBUTRIN XL) 150 MG 24 hr tablet, Take 1 tablet by mouth Daily., Disp: , Rfl:     Multiple Vitamins-Minerals (One-A-Day Womens VitaCraves) chewable tablet, Chew., Disp: , Rfl:     prazosin (MINIPRESS) 1 MG capsule, , Disp: , Rfl:     norethindrone-ethinyl estradiol-ferrous fumarate (LOESTIN 24 FE) 1-20 MG-MCG(24) per tablet, Take 1 tablet by mouth Daily., Disp: 28 tablet, Rfl: 12      OBJECTIVE:  /74 (BP Location: Left arm, Patient Position: Sitting, Cuff Size: Adult)   Ht 160 cm (63\")   Wt 64.7 kg (142 lb 9.6 oz)   LMP 06/16/2025 (Approximate)   Breastfeeding No   BMI 25.26 kg/m²    Physical Exam  Exam conducted with a chaperone present.   Genitourinary:     Labia:         Right: No tenderness, lesion or injury.         Left: No tenderness, lesion or injury.       Vagina: Tenderness present.      " Cervix: Normal.      Uterus: Tender.       Adnexa:         Right: Tenderness present.         Left: Tenderness present.       Comments: Discomfort with small speculum but not with insertion of finger for bimanual exam        Assessment/Plan    Diagnoses and all orders for this visit:    1. Pelvic pain in female (Primary)  -     Bacterial Vaginosis Panel Plus (PAD)    2. Urinary frequency  Comments:  past three months  Orders:  -     POC Urinalysis Dipstick  -     Bacterial Vaginosis Panel Plus (PAD)    3. Female dyspareunia         An After Visit Summary was printed and given to the patient at discharge.  Return if symptoms worsen or fail to improve, for Next scheduled follow up. Sooner if problems arise.          Kimberly ALEXANDER. 7/17/2025   Electronically Signed

## 2025-07-18 DIAGNOSIS — A74.9 CHLAMYDIA INFECTION: Primary | ICD-10-CM

## 2025-07-18 LAB
BACTERIAL VAGINITIS (PAD PANTHER): NEGATIVE
C TRACH RRNA SPEC QL NAA+PROBE: DETECTED
CANDIDA GLABRATA (PAD PANTHER): NEGATIVE
CANDIDA SPECIES (PAD PANTHER): NEGATIVE
MYCOPLASMA GENITALIUM (PAD PANTHER): NEGATIVE
N GONORRHOEA RRNA SPEC QL NAA+PROBE: NOT DETECTED
TRICHOMONAS VAGINALIS (PAD PANTHER): NEGATIVE

## 2025-07-18 RX ORDER — DOXYCYCLINE 100 MG/1
100 CAPSULE ORAL 2 TIMES DAILY
Qty: 14 CAPSULE | Refills: 0 | Status: SHIPPED | OUTPATIENT
Start: 2025-07-18 | End: 2025-07-25

## 2025-07-22 DIAGNOSIS — N92.0 MENORRHAGIA WITH REGULAR CYCLE: ICD-10-CM
